# Patient Record
Sex: FEMALE | Race: WHITE | NOT HISPANIC OR LATINO | Employment: OTHER | URBAN - METROPOLITAN AREA
[De-identification: names, ages, dates, MRNs, and addresses within clinical notes are randomized per-mention and may not be internally consistent; named-entity substitution may affect disease eponyms.]

---

## 2017-05-12 DIAGNOSIS — Z01.812 PRE-PROCEDURAL LABORATORY EXAMINATION: ICD-10-CM

## 2017-05-12 DIAGNOSIS — Z01.810 PRE-OPERATIVE CARDIOVASCULAR EXAMINATION: ICD-10-CM

## 2017-05-12 LAB
ERYTHROCYTE [DISTWIDTH] IN BLOOD BY AUTOMATED COUNT: 42.8 FL (ref 35.9–50)
HCT VFR BLD AUTO: 41.1 % (ref 37–47)
HGB BLD-MCNC: 13.4 G/DL (ref 12–16)
MCH RBC QN AUTO: 29.3 PG (ref 27–33)
MCHC RBC AUTO-ENTMCNC: 32.6 G/DL (ref 33.6–35)
MCV RBC AUTO: 89.7 FL (ref 81.4–97.8)
PLATELET # BLD AUTO: 306 K/UL (ref 164–446)
PMV BLD AUTO: 9.6 FL (ref 9–12.9)
RBC # BLD AUTO: 4.58 M/UL (ref 4.2–5.4)
WBC # BLD AUTO: 7.2 K/UL (ref 4.8–10.8)

## 2017-05-12 PROCEDURE — 93010 ELECTROCARDIOGRAM REPORT: CPT | Performed by: INTERNAL MEDICINE

## 2017-05-12 PROCEDURE — 36415 COLL VENOUS BLD VENIPUNCTURE: CPT

## 2017-05-12 PROCEDURE — 85027 COMPLETE CBC AUTOMATED: CPT

## 2017-05-12 PROCEDURE — 93005 ELECTROCARDIOGRAM TRACING: CPT

## 2017-05-12 RX ORDER — LEVOTHYROXINE SODIUM 88 UG/1
88 TABLET ORAL
COMMUNITY
End: 2022-03-01 | Stop reason: SDUPTHER

## 2017-05-12 RX ORDER — M-VIT,TX,IRON,MINS/CALC/FOLIC 27MG-0.4MG
1 TABLET ORAL DAILY
Status: ON HOLD | COMMUNITY
End: 2021-08-08

## 2017-05-13 LAB — EKG IMPRESSION: NORMAL

## 2017-06-02 ENCOUNTER — HOSPITAL ENCOUNTER (OUTPATIENT)
Facility: MEDICAL CENTER | Age: 67
End: 2017-06-02
Attending: OTOLARYNGOLOGY | Admitting: OTOLARYNGOLOGY
Payer: MEDICARE

## 2017-06-02 VITALS
RESPIRATION RATE: 16 BRPM | SYSTOLIC BLOOD PRESSURE: 192 MMHG | HEART RATE: 67 BPM | WEIGHT: 160.94 LBS | BODY MASS INDEX: 28.52 KG/M2 | TEMPERATURE: 98.1 F | HEIGHT: 63 IN | DIASTOLIC BLOOD PRESSURE: 80 MMHG | OXYGEN SATURATION: 97 %

## 2017-06-02 PROBLEM — J34.2 DEVIATED NASAL SEPTUM: Status: ACTIVE | Noted: 2017-06-02

## 2017-06-02 PROCEDURE — 700102 HCHG RX REV CODE 250 W/ 637 OVERRIDE(OP)

## 2017-06-02 PROCEDURE — 160035 HCHG PACU - 1ST 60 MINS PHASE I: Performed by: OTOLARYNGOLOGY

## 2017-06-02 PROCEDURE — 160009 HCHG ANES TIME/MIN: Performed by: OTOLARYNGOLOGY

## 2017-06-02 PROCEDURE — 160036 HCHG PACU - EA ADDL 30 MINS PHASE I: Performed by: OTOLARYNGOLOGY

## 2017-06-02 PROCEDURE — 160041 HCHG SURGERY MINUTES - EA ADDL 1 MIN LEVEL 4: Performed by: OTOLARYNGOLOGY

## 2017-06-02 PROCEDURE — 502626 HCHG SURGIFLO HEMOSTATIC MATRIX 6ML: Performed by: OTOLARYNGOLOGY

## 2017-06-02 PROCEDURE — 700111 HCHG RX REV CODE 636 W/ 250 OVERRIDE (IP)

## 2017-06-02 PROCEDURE — 700101 HCHG RX REV CODE 250

## 2017-06-02 PROCEDURE — 501838 HCHG SUTURE GENERAL: Performed by: OTOLARYNGOLOGY

## 2017-06-02 PROCEDURE — 502573 HCHG PACK, ENT: Performed by: OTOLARYNGOLOGY

## 2017-06-02 PROCEDURE — 500331 HCHG COTTONOID, SURG PATTIE: Performed by: OTOLARYNGOLOGY

## 2017-06-02 PROCEDURE — 160002 HCHG RECOVERY MINUTES (STAT): Performed by: OTOLARYNGOLOGY

## 2017-06-02 PROCEDURE — 500125 HCHG BOVIE, HANDLE: Performed by: OTOLARYNGOLOGY

## 2017-06-02 PROCEDURE — 501404 HCHG SPLINT, NASAL DOYLE AIRWAY: Performed by: OTOLARYNGOLOGY

## 2017-06-02 PROCEDURE — 160048 HCHG OR STATISTICAL LEVEL 1-5: Performed by: OTOLARYNGOLOGY

## 2017-06-02 PROCEDURE — 502240 HCHG MISC OR SUPPLY RC 0272: Performed by: OTOLARYNGOLOGY

## 2017-06-02 PROCEDURE — A9270 NON-COVERED ITEM OR SERVICE: HCPCS

## 2017-06-02 PROCEDURE — 160029 HCHG SURGERY MINUTES - 1ST 30 MINS LEVEL 4: Performed by: OTOLARYNGOLOGY

## 2017-06-02 RX ORDER — OXYMETAZOLINE HYDROCHLORIDE 0.05 G/100ML
SPRAY NASAL
Status: DISCONTINUED | OUTPATIENT
Start: 2017-06-02 | End: 2017-06-02 | Stop reason: HOSPADM

## 2017-06-02 RX ORDER — SODIUM CHLORIDE, SODIUM LACTATE, POTASSIUM CHLORIDE, CALCIUM CHLORIDE 600; 310; 30; 20 MG/100ML; MG/100ML; MG/100ML; MG/100ML
INJECTION, SOLUTION INTRAVENOUS CONTINUOUS
Status: DISCONTINUED | OUTPATIENT
Start: 2017-06-02 | End: 2017-06-02 | Stop reason: HOSPADM

## 2017-06-02 RX ORDER — CEPHALEXIN 500 MG/1
500 CAPSULE ORAL 2 TIMES DAILY
Qty: 20 CAP | Refills: 0 | Status: ON HOLD | OUTPATIENT
Start: 2017-06-02 | End: 2021-08-08

## 2017-06-02 RX ORDER — OXYCODONE HCL 5 MG/5 ML
SOLUTION, ORAL ORAL
Status: COMPLETED
Start: 2017-06-02 | End: 2017-06-02

## 2017-06-02 RX ORDER — HYDROCODONE BITARTRATE AND ACETAMINOPHEN 5; 325 MG/1; MG/1
1-2 TABLET ORAL EVERY 4 HOURS PRN
Qty: 50 TAB | Refills: 0 | Status: ON HOLD | OUTPATIENT
Start: 2017-06-02 | End: 2021-08-08

## 2017-06-02 RX ORDER — LIDOCAINE HYDROCHLORIDE AND EPINEPHRINE 10; 10 MG/ML; UG/ML
INJECTION, SOLUTION INFILTRATION; PERINEURAL
Status: DISCONTINUED | OUTPATIENT
Start: 2017-06-02 | End: 2017-06-02 | Stop reason: HOSPADM

## 2017-06-02 RX ORDER — HYDRALAZINE HYDROCHLORIDE 20 MG/ML
INJECTION INTRAMUSCULAR; INTRAVENOUS
Status: COMPLETED
Start: 2017-06-02 | End: 2017-06-02

## 2017-06-02 RX ORDER — OXYMETAZOLINE HYDROCHLORIDE 0.05 G/100ML
SPRAY NASAL
Status: COMPLETED
Start: 2017-06-02 | End: 2017-06-02

## 2017-06-02 RX ORDER — PRAVASTATIN SODIUM 40 MG
40 TABLET ORAL NIGHTLY
COMMUNITY
End: 2022-06-22 | Stop reason: SDUPTHER

## 2017-06-02 RX ADMIN — SODIUM CHLORIDE, SODIUM LACTATE, POTASSIUM CHLORIDE, CALCIUM CHLORIDE 1000 ML: 600; 310; 30; 20 INJECTION, SOLUTION INTRAVENOUS at 11:30

## 2017-06-02 RX ADMIN — HYDRALAZINE HYDROCHLORIDE 10 MG: 20 INJECTION INTRAMUSCULAR; INTRAVENOUS at 12:48

## 2017-06-02 RX ADMIN — OXYCODONE HYDROCHLORIDE 5 MG: 5 SOLUTION ORAL at 13:15

## 2017-06-02 RX ADMIN — OXYMETAZOLINE HYDROCHLORIDE 2 SPRAY: 5 SPRAY NASAL at 11:30

## 2017-06-02 ASSESSMENT — PAIN SCALES - GENERAL
PAINLEVEL_OUTOF10: 0
PAINLEVEL_OUTOF10: 4
PAINLEVEL_OUTOF10: 0
PAINLEVEL_OUTOF10: 2
PAINLEVEL_OUTOF10: 4
PAINLEVEL_OUTOF10: 0
PAINLEVEL_OUTOF10: 2
PAINLEVEL_OUTOF10: 2
PAINLEVEL_OUTOF10: 5

## 2017-06-02 NOTE — DISCHARGE INSTRUCTIONS
ACTIVITY: Rest and take it easy for the first 24 hours.  A responsible adult is recommended to remain with you during that time.  It is normal to feel sleepy.  We encourage you to not do anything that requires balance, judgment or coordination.    MILD FLU-LIKE SYMPTOMS ARE NORMAL. YOU MAY EXPERIENCE GENERALIZED MUSCLE ACHES, THROAT IRRITATION, HEADACHE AND/OR SOME NAUSEA.    FOR 24 HOURS DO NOT:  Drive, operate machinery or run household appliances.  Drink beer or alcoholic beverages.   Make important decisions or sign legal documents.    SPECIAL INSTRUCTIONS: *see nasal surgery instruction sheet**    DIET: To avoid nausea, slowly advance diet as tolerated, avoiding spicy or greasy foods for the first day.  Add more substantial food to your diet according to your physician's instructions.  Babies can be fed formula or breast milk as soon as they are hungry.  INCREASE FLUIDS AND FIBER TO AVOID CONSTIPATION.    SURGICAL DRESSING/BATHING: *change drip pad as needed, do NOT blow nose**    FOLLOW-UP APPOINTMENT:  A follow-up appointment should be arranged with your doctor; call to schedule.    You should CALL YOUR PHYSICIAN if you develop:  Fever greater than 101 degrees F.  Pain not relieved by medication, or persistent nausea or vomiting.  Excessive bleeding (blood soaking through dressing) or unexpected drainage from the wound.  Extreme redness or swelling around the incision site, drainage of pus or foul smelling drainage.  Inability to urinate or empty your bladder within 8 hours.  Problems with breathing or chest pain.    You should call 911 if you develop problems with breathing or chest pain.  If you are unable to contact your doctor or surgical center, you should go to the nearest emergency room or urgent care center.  Physician's telephone #: *[phone]**    If any questions arise, call your doctor.  If your doctor is not available, please feel free to call the Surgical Center at [phone].  The Center is  open Monday through Friday from 7AM to 7PM.  You can also call the HEALTH HOTLINE open 24 hours/day, 7 days/week and speak to a nurse at [phone], or toll free at [phone].    A registered nurse may call you a few days after your surgery to see how you are doing after your procedure.    MEDICATIONS: Resume taking daily medication.  Take prescribed pain medication with food.  If no medication is prescribed, you may take non-aspirin pain medication if needed.  PAIN MEDICATION CAN BE VERY CONSTIPATING.  Take a stool softener or laxative such as senokot, pericolace, or milk of magnesia if needed.    Prescription given for *keflex, and norco**.  Last pain medication given at **____________*.    If your physician has prescribed pain medication that includes Acetaminophen (Tylenol), do not take additional Acetaminophen (Tylenol) while taking the prescribed medication.    Depression / Suicide Risk    As you are discharged from this [facility] facility, it is important to learn how to keep safe from harming yourself.    Recognize the warning signs:  · Abrupt changes in personality, positive or negative- including increase in energy   · Giving away possessions  · Change in eating patterns- significant weight changes-  positive or negative  · Change in sleeping patterns- unable to sleep or sleeping all the time   · Unwillingness or inability to communicate  · Depression  · Unusual sadness, discouragement and loneliness  · Talk of wanting to die  · Neglect of personal appearance   · Rebelliousness- reckless behavior  · Withdrawal from people/activities they love  · Confusion- inability to concentrate     If you or a loved one observes any of these behaviors or has concerns about self-harm, here's what you can do:  · Talk about it- your feelings and reasons for harming yourself  · Remove any means that you might use to hurt yourself (examples: pills, rope, extension cords, firearm)  · Get professional help from  the community (Mental Health, Substance Abuse, psychological counseling)  · Do not be alone:Call your Safe Contact- someone whom you trust who will be there for you.  · Call your local CRISIS HOTLINE 092-3376 or 172-449-8685  · Call your local Children's Mobile Crisis Response Team [place] (888) 912-6364 or www.myBestHelper  · Call the toll free National Suicide Prevention Hotlines   · National Suicide Prevention Lifeline 204-849-FHFT (3492)  · National Hope Line Network 800-SUICIDE (434-1847)

## 2017-06-02 NOTE — PROGRESS NOTES
1245-received pt from OR with report from Dr Anderson.  Pt hypertensive, medicated with hydralazine per order. Drip pad to nose, dry. HOB elevated. Pt denies pain.  1315-pt medicated with oxycodone PO for pain 5/10.  BP improved  1330-sister to bedside.    1345-pt rates pain 4/10, tolerable.  Handoff report to Judit LOCK

## 2017-06-02 NOTE — IP AVS SNAPSHOT
" Home Care Instructions                                                                                                                Name:Yuki Caraballo  Medical Record Number:[id number]  CSN: [id number]    YOB: 1950   Age: 67 y.o.  Sex: female  HT:1.6 m (5' 3\") WT: 73 kg (160 lb 15 oz)          Admit Date: 6/2/2017     Discharge Date:   Today's Date: 6/2/2017  Attending Doctor:  Rodrick Coronado M.D.                  Allergies:  Tequin                Discharge Instructions         ACTIVITY: Rest and take it easy for the first 24 hours.  A responsible adult is recommended to remain with you during that time.  It is normal to feel sleepy.  We encourage you to not do anything that requires balance, judgment or coordination.    MILD FLU-LIKE SYMPTOMS ARE NORMAL. YOU MAY EXPERIENCE GENERALIZED MUSCLE ACHES, THROAT IRRITATION, HEADACHE AND/OR SOME NAUSEA.    FOR 24 HOURS DO NOT:  Drive, operate machinery or run household appliances.  Drink beer or alcoholic beverages.   Make important decisions or sign legal documents.    SPECIAL INSTRUCTIONS: *see nasal surgery instruction sheet**    DIET: To avoid nausea, slowly advance diet as tolerated, avoiding spicy or greasy foods for the first day.  Add more substantial food to your diet according to your physician's instructions.  Babies can be fed formula or breast milk as soon as they are hungry.  INCREASE FLUIDS AND FIBER TO AVOID CONSTIPATION.    SURGICAL DRESSING/BATHING: *change drip pad as needed, do NOT blow nose**    FOLLOW-UP APPOINTMENT:  A follow-up appointment should be arranged with your doctor; call to schedule.    You should CALL YOUR PHYSICIAN if you develop:  Fever greater than 101 degrees F.  Pain not relieved by medication, or persistent nausea or vomiting.  Excessive bleeding (blood soaking through dressing) or unexpected drainage from the wound.  Extreme redness or swelling around the incision site, drainage of pus or foul smelling " drainage.  Inability to urinate or empty your bladder within 8 hours.  Problems with breathing or chest pain.    You should call 911 if you develop problems with breathing or chest pain.  If you are unable to contact your doctor or surgical center, you should go to the nearest emergency room or urgent care center.  Physician's telephone #: *[phone]**    If any questions arise, call your doctor.  If your doctor is not available, please feel free to call the Surgical Center at [phone].  The Center is open Monday through Friday from 7AM to 7PM.  You can also call the [employer] HOTLINE open 24 hours/day, 7 days/week and speak to a nurse at [phone], or toll free at [phone].    A registered nurse may call you a few days after your surgery to see how you are doing after your procedure.    MEDICATIONS: Resume taking daily medication.  Take prescribed pain medication with food.  If no medication is prescribed, you may take non-aspirin pain medication if needed.  PAIN MEDICATION CAN BE VERY CONSTIPATING.  Take a stool softener or laxative such as senokot, pericolace, or milk of magnesia if needed.    Prescription given for *keflex, and norco**.  Last pain medication given at **____________*.    If your physician has prescribed pain medication that includes Acetaminophen (Tylenol), do not take additional Acetaminophen (Tylenol) while taking the prescribed medication.    Depression / Suicide Risk    As you are discharged from this [facility] facility, it is important to learn how to keep safe from harming yourself.    Recognize the warning signs:  · Abrupt changes in personality, positive or negative- including increase in energy   · Giving away possessions  · Change in eating patterns- significant weight changes-  positive or negative  · Change in sleeping patterns- unable to sleep or sleeping all the time   · Unwillingness or inability to communicate  · Depression  · Unusual sadness, discouragement and  loneliness  · Talk of wanting to die  · Neglect of personal appearance   · Rebelliousness- reckless behavior  · Withdrawal from people/activities they love  · Confusion- inability to concentrate     If you or a loved one observes any of these behaviors or has concerns about self-harm, here's what you can do:  · Talk about it- your feelings and reasons for harming yourself  · Remove any means that you might use to hurt yourself (examples: pills, rope, extension cords, firearm)  · Get professional help from the community (Mental Health, Substance Abuse, psychological counseling)  · Do not be alone:Call your Safe Contact- someone whom you trust who will be there for you.  · Call your local CRISIS HOTLINE 564-3824 or 488-027-7893  · Call your local Children's Mobile Crisis Response Team [place] (646) 822-2213 or www.[employer] Mobile  · Call the toll free National Suicide Prevention Hotlines   · National Suicide Prevention Lifeline 990-228-NQXU (5232)  · National Hope Line Network 800-SUICIDE (208-5549)       Medication List      START taking these medications        Instructions    Morning Afternoon Evening Bedtime    cephALEXin 500 MG Caps   Commonly known as:  KEFLEX        Take 1 Cap by mouth 2 times a day.   Dose:  500 mg                        hydrocodone-acetaminophen 5-325 MG Tabs per tablet   Commonly known as:  NORCO        Take 1-2 Tabs by mouth every four hours as needed.   Dose:  1-2 Tab                          ASK your doctor about these medications        Instructions    Morning Afternoon Evening Bedtime    levothyroxine 88 MCG Tabs   Commonly known as:  SYNTHROID        Take 88 mcg by mouth Every morning on an empty stomach.   Dose:  88 mcg                        pravastatin 10 MG Tabs   Commonly known as:  PRAVACHOL        Take 10 mg by mouth every evening.   Dose:  10 mg                        PROBIOTIC PO        Take  by mouth 1 time daily as needed.                        therapeutic  multivitamin-minerals Tabs        Take 1 Tab by mouth every day.   Dose:  1 Tab                             Where to Get Your Medications      You can get these medications from any pharmacy     Bring a paper prescription for each of these medications    - cephALEXin 500 MG Caps  - hydrocodone-acetaminophen 5-325 MG Tabs per tablet            Medication Information     Above and/or attached are the medications your physician expects you to take upon discharge. Review all of your home medications and newly ordered medications with your doctor and/or pharmacist. Follow medication instructions as directed by your doctor and/or pharmacist. Please keep your medication list with you and share with your physician. Update the information when medications are discontinued, doses are changed, or new medications (including over-the-counter products) are added; and carry medication information at all times in the event of emergency situations.        Resources     Quit Smoking / Tobacco Use:    I understand the use of any tobacco products increases my chance of suffering from future heart disease or stroke and could cause other illnesses which may shorten my life. Quitting the use of tobacco products is the single most important thing I can do to improve my health. For further information on smoking / tobacco cessation call a Toll Free Quit Line at 1-370.469.8293 (*[employer]) or 1-275.745.6075 (American Lung Association) or you can access the web based program at www.lungusa.org.    [place] Tobacco Users Help Line:  (569) 160-8558       Toll Free: 1-637.381.1415  Quit Tobacco Program [facility] (646)850-6521    Crisis Hotline:    [place] Crisis Hotline:  0-945-XNGGNIM or 1-477.109.1665    [place] Crisis Hotline:    1-810.252.2047 or 993-403-6155    Discharge Survey:   Thank you for choosing [facility]. We hope we did everything we could to make your hospital stay a pleasant one. You may  be receiving a survey and we would appreciate your time and participation in answering the questions. Your input is very valuable to us in our efforts to improve our service to our patients and their families.            Signatures     My signature on this form indicates that:    1. I acknowledge receipt and understanding of these Home Care Instruction.  2. My questions regarding this information have been answered to my satisfaction.  3. I have formulated a plan with my discharge nurse to obtain my prescribed medications for home.    __________________________________      __________________________________                   Patient Signature                                 Guardian/Responsible Adult Signature      __________________________________                 __________       ________                       Nurse Signature                                               Date                 Time

## 2017-06-02 NOTE — IP AVS SNAPSHOT
6/2/2017    Yuki Caraballo  [address] [place] [address]    Dear Yuki:    [facility] wants to ensure your discharge home is safe and you or your loved ones have had all of your questions answered regarding your care after you leave the hospital.    Below is a list of resources and contact information should you have any questions regarding your hospital stay, follow-up instructions, or active medical symptoms.    Questions or Concerns Regarding… Contact   Medical Questions Related to Your Discharge  (7 days a week, 8am-5pm) Contact a Nurse Care Coordinator   [phone]   Medical Questions Not Related to Your Discharge  (24 hours a day / 7 days a week)  Contact the Nurse Health Line   [phone]    Medications or Discharge Instructions Refer to your discharge packet   or contact your [facility] Primary Care Provider   [phone]   Follow-up Appointment(s) Schedule your appointment via [employer]   or contact Scheduling [phone]   Billing Review your statement via [employer]  or contact Billing [phone]   Medical Records Review your records via [employer]   or contact Medical Records [phone]     You may receive a telephone call within two days of discharge. This call is to make certain you understand your discharge instructions and have the opportunity to have any questions answered. You can also easily access your medical information, test results and upcoming appointments via the [employer] free online health management tool. You can learn more and sign up at [employer]ee. For assistance setting up your [employer] account, please call [phone].    Once again, we want to ensure your discharge home is safe and that you have a clear understanding of any next steps in your care. If you have any questions or concerns, please do not hesitate to contact us, we are here for you. Thank you for choosing [facility] for your healthcare needs.    Sincerely,    Your [facility] Healthcare Team

## 2017-06-02 NOTE — OR NURSING
1345 Received report from ASHVIN Gavin. VSS< in no signs of distress. Sister at BS.    1415 Discharge instructions given to pt and family, both verbalize understanding.   Pt meets discharge criteria. Able to dress and steady on feet. New drip pad applied to nose, small amount of bloody drainage to note. Two sprays of afrin applied to each nostril.    1425 Pt discharged, taken to car in  by volunteer.  All questions/concerns addressed.

## 2017-06-02 NOTE — OR SURGEON
Immediate Post-Operative Note      PreOp Diagnosis: septal deviation, bilateral inferior turbinate hypertrophy    PostOp Diagnosis: same    Procedure(s):  SEPTOPLASTY - Wound Class: Clean Contaminated  TURBINATE REDUCTION - RESECTION W/SUBMUCOSAL APPROACH - Wound Class: Clean Contaminated    Surgeon(s):  Rodrick Coronado M.D.    Anesthesiologist/Type of Anesthesia:  Anesthesiologist: Broderick Anderson M.D./General    Surgical Staff:  Circulator: Dotty Harper R.N.  Relief Circulator: Chelita Gonzalez R.N.  Scrub Person: Deepti Riggs    Specimen: none    Estimated Blood Loss: 30 ml    Findings: septal dev    Complications: none        6/2/2017 12:45 PM Rodrick Coronado

## 2017-06-05 NOTE — OP REPORT
DATE OF SERVICE:  06/02/2017    DATE OF OPERATION:  06/02/2017    PREOPERATIVE DIAGNOSIS:  Nasal septal deviation with bilateral inferior   turbinate hypertrophy.    POSTOPERATIVE DIAGNOSES:  Nasal septal deviation with bilateral inferior   turbinate hypertrophy.    PROCEDURE PERFORMED:  Nasal septoplasty with bilateral submucous resection of   the inferior turbinates.    SURGEON:  Rodrick Coronado MD    ANESTHESIA:  General endotracheal.    INDICATIONS:  This is a very nice patient with recurrent epistaxis and nasal   obstruction secondary to septal deviation.  Correction is indicated.    DESCRIPTION OF PROCEDURE:  The patient was placed on the operating table in   supine position.  After adequate general endotracheal anesthesia was   administered, the right and left septal mucosa and right and left inferior   turbinates were anesthetized with 1% lidocaine with 1:100,000 epinephrine   using approximately 10 mL.  Afrin-soaked pledgets were placed in the nose   bilaterally and the face was draped in a clean fashion.  A hemitransfixion   incision was performed on the left with #15 blade and a submucoperichondrial   and mucoperiosteal flap was raised with a caudal elevator.  Anterior to the   septal deflection, the septal cartilage was incised and an opposite-sided   submucoperichondrial and mucoperiosteal flap was raised with a caudal   elevator.  The deviated portion of the nasal septal cartilage and bone were   removed with a combination of a Magdi forceps and a V-chisel.  Once the   septum was reduced to the midline, the hemitransfixion incision was closed   with a 4-0 Vicryl in an interrupted fashion.  The right and left inferior   turbinates were then trimmed in the submucous fashion using straight and   curved turbinate scissors under direct visualization with a 4 mm, 0-degree   Storz endoscope.  Hemostasis was acquired by using suction electrocautery.    Surgiflo was layered along the inferior turbinates  and bacitracin   ointment-soaked Martinez splints were placed in the right and left nares and   secured anteriorly to the columella with a 3-0 nylon suture.  The patient was   extubated in the operating room, brought to the recovery room in satisfactory   condition and there were no intraoperative complications.       ____________________________________     MD KIMBERLEE WEEMS / COLLETTE    DD:  06/05/2017 08:00:04  DT:  06/05/2017 09:52:03    D#:  [id number]  Job#:  [id number]

## 2021-01-15 DIAGNOSIS — Z23 NEED FOR VACCINATION: ICD-10-CM

## 2021-08-08 ENCOUNTER — APPOINTMENT (OUTPATIENT)
Dept: RADIOLOGY | Facility: MEDICAL CENTER | Age: 71
End: 2021-08-08
Attending: STUDENT IN AN ORGANIZED HEALTH CARE EDUCATION/TRAINING PROGRAM
Payer: MEDICARE

## 2021-08-08 ENCOUNTER — APPOINTMENT (OUTPATIENT)
Dept: RADIOLOGY | Facility: MEDICAL CENTER | Age: 71
End: 2021-08-08
Attending: EMERGENCY MEDICINE
Payer: MEDICARE

## 2021-08-08 ENCOUNTER — HOSPITAL ENCOUNTER (OUTPATIENT)
Facility: MEDICAL CENTER | Age: 71
End: 2021-08-09
Attending: EMERGENCY MEDICINE | Admitting: STUDENT IN AN ORGANIZED HEALTH CARE EDUCATION/TRAINING PROGRAM
Payer: MEDICARE

## 2021-08-08 DIAGNOSIS — S82.842A: ICD-10-CM

## 2021-08-08 DIAGNOSIS — S82.892P CLOSED FRACTURE OF LEFT ANKLE WITH MALUNION: ICD-10-CM

## 2021-08-08 DIAGNOSIS — I95.9 HYPOTENSION, UNSPECIFIED HYPOTENSION TYPE: ICD-10-CM

## 2021-08-08 DIAGNOSIS — R55 SYNCOPE, UNSPECIFIED SYNCOPE TYPE: ICD-10-CM

## 2021-08-08 PROBLEM — E03.9 ACQUIRED HYPOTHYROIDISM: Status: ACTIVE | Noted: 2021-08-08

## 2021-08-08 PROBLEM — R73.9 HYPERGLYCEMIA: Status: ACTIVE | Noted: 2021-08-08

## 2021-08-08 PROBLEM — S82.899A CLOSED FRACTURE OF ANKLE: Status: ACTIVE | Noted: 2021-08-08

## 2021-08-08 PROBLEM — E87.1 HYPONATREMIA: Status: ACTIVE | Noted: 2021-08-08

## 2021-08-08 PROBLEM — I10 ESSENTIAL HYPERTENSION: Status: ACTIVE | Noted: 2021-08-08

## 2021-08-08 PROBLEM — E78.2 MIXED HYPERLIPIDEMIA: Status: ACTIVE | Noted: 2021-08-08

## 2021-08-08 LAB
ALBUMIN SERPL BCP-MCNC: 3.8 G/DL (ref 3.2–4.9)
ALBUMIN/GLOB SERPL: 1.2 G/DL
ALP SERPL-CCNC: 60 U/L (ref 30–99)
ALT SERPL-CCNC: 20 U/L (ref 2–50)
ANION GAP SERPL CALC-SCNC: 13 MMOL/L (ref 7–16)
AST SERPL-CCNC: 20 U/L (ref 12–45)
BASOPHILS # BLD AUTO: 0.3 % (ref 0–1.8)
BASOPHILS # BLD: 0.03 K/UL (ref 0–0.12)
BILIRUB SERPL-MCNC: 0.4 MG/DL (ref 0.1–1.5)
BUN SERPL-MCNC: 18 MG/DL (ref 8–22)
CALCIUM SERPL-MCNC: 9.3 MG/DL (ref 8.5–10.5)
CHLORIDE SERPL-SCNC: 99 MMOL/L (ref 96–112)
CO2 SERPL-SCNC: 22 MMOL/L (ref 20–33)
CREAT SERPL-MCNC: 1.19 MG/DL (ref 0.5–1.4)
EKG IMPRESSION: NORMAL
EOSINOPHIL # BLD AUTO: 0.07 K/UL (ref 0–0.51)
EOSINOPHIL NFR BLD: 0.8 % (ref 0–6.9)
ERYTHROCYTE [DISTWIDTH] IN BLOOD BY AUTOMATED COUNT: 43.8 FL (ref 35.9–50)
EST. AVERAGE GLUCOSE BLD GHB EST-MCNC: 111 MG/DL
GLOBULIN SER CALC-MCNC: 3.1 G/DL (ref 1.9–3.5)
GLUCOSE SERPL-MCNC: 158 MG/DL (ref 65–99)
HBA1C MFR BLD: 5.5 % (ref 4–5.6)
HCT VFR BLD AUTO: 37.4 % (ref 37–47)
HGB BLD-MCNC: 12.8 G/DL (ref 12–16)
IMM GRANULOCYTES # BLD AUTO: 0.03 K/UL (ref 0–0.11)
IMM GRANULOCYTES NFR BLD AUTO: 0.3 % (ref 0–0.9)
LACTATE BLD-SCNC: 1.6 MMOL/L (ref 0.5–2)
LYMPHOCYTES # BLD AUTO: 3.57 K/UL (ref 1–4.8)
LYMPHOCYTES NFR BLD: 39.5 % (ref 22–41)
MCH RBC QN AUTO: 30.8 PG (ref 27–33)
MCHC RBC AUTO-ENTMCNC: 34.2 G/DL (ref 33.6–35)
MCV RBC AUTO: 90.1 FL (ref 81.4–97.8)
MONOCYTES # BLD AUTO: 0.52 K/UL (ref 0–0.85)
MONOCYTES NFR BLD AUTO: 5.8 % (ref 0–13.4)
NEUTROPHILS # BLD AUTO: 4.81 K/UL (ref 2–7.15)
NEUTROPHILS NFR BLD: 53.3 % (ref 44–72)
NRBC # BLD AUTO: 0 K/UL
NRBC BLD-RTO: 0 /100 WBC
PLATELET # BLD AUTO: 279 K/UL (ref 164–446)
PMV BLD AUTO: 10.1 FL (ref 9–12.9)
POTASSIUM SERPL-SCNC: 3.6 MMOL/L (ref 3.6–5.5)
PROT SERPL-MCNC: 6.9 G/DL (ref 6–8.2)
RBC # BLD AUTO: 4.15 M/UL (ref 4.2–5.4)
SODIUM SERPL-SCNC: 134 MMOL/L (ref 135–145)
TROPONIN T SERPL-MCNC: 9 NG/L (ref 6–19)
TSH SERPL DL<=0.005 MIU/L-ACNC: 1.36 UIU/ML (ref 0.38–5.33)
WBC # BLD AUTO: 9 K/UL (ref 4.8–10.8)

## 2021-08-08 PROCEDURE — 700111 HCHG RX REV CODE 636 W/ 250 OVERRIDE (IP): Performed by: EMERGENCY MEDICINE

## 2021-08-08 PROCEDURE — 73600 X-RAY EXAM OF ANKLE: CPT | Mod: LT

## 2021-08-08 PROCEDURE — 93005 ELECTROCARDIOGRAM TRACING: CPT

## 2021-08-08 PROCEDURE — 99220 PR INITIAL OBSERVATION CARE,LEVL III: CPT | Performed by: STUDENT IN AN ORGANIZED HEALTH CARE EDUCATION/TRAINING PROGRAM

## 2021-08-08 PROCEDURE — 83605 ASSAY OF LACTIC ACID: CPT

## 2021-08-08 PROCEDURE — 85025 COMPLETE CBC W/AUTO DIFF WBC: CPT

## 2021-08-08 PROCEDURE — 84443 ASSAY THYROID STIM HORMONE: CPT

## 2021-08-08 PROCEDURE — 93005 ELECTROCARDIOGRAM TRACING: CPT | Performed by: EMERGENCY MEDICINE

## 2021-08-08 PROCEDURE — 96375 TX/PRO/DX INJ NEW DRUG ADDON: CPT | Mod: XU

## 2021-08-08 PROCEDURE — 99285 EMERGENCY DEPT VISIT HI MDM: CPT

## 2021-08-08 PROCEDURE — 84484 ASSAY OF TROPONIN QUANT: CPT

## 2021-08-08 PROCEDURE — 302875 HCHG BANDAGE ACE 4 OR 6""

## 2021-08-08 PROCEDURE — 302874 HCHG BANDAGE ACE 2 OR 3""

## 2021-08-08 PROCEDURE — G0378 HOSPITAL OBSERVATION PER HR: HCPCS

## 2021-08-08 PROCEDURE — 29505 APPLICATION LONG LEG SPLINT: CPT

## 2021-08-08 PROCEDURE — 80053 COMPREHEN METABOLIC PANEL: CPT

## 2021-08-08 PROCEDURE — 94799 UNLISTED PULMONARY SVC/PX: CPT

## 2021-08-08 PROCEDURE — 27840 TREAT ANKLE DISLOCATION: CPT

## 2021-08-08 PROCEDURE — 71045 X-RAY EXAM CHEST 1 VIEW: CPT

## 2021-08-08 PROCEDURE — 96376 TX/PRO/DX INJ SAME DRUG ADON: CPT | Mod: XU

## 2021-08-08 PROCEDURE — 83036 HEMOGLOBIN GLYCOSYLATED A1C: CPT

## 2021-08-08 PROCEDURE — 96374 THER/PROPH/DIAG INJ IV PUSH: CPT | Mod: XU

## 2021-08-08 PROCEDURE — 700105 HCHG RX REV CODE 258: Performed by: EMERGENCY MEDICINE

## 2021-08-08 RX ORDER — PROPOFOL 10 MG/ML
100 INJECTION, EMULSION INTRAVENOUS ONCE
Status: COMPLETED | OUTPATIENT
Start: 2021-08-08 | End: 2021-08-08

## 2021-08-08 RX ORDER — ENALAPRILAT 1.25 MG/ML
1.25 INJECTION INTRAVENOUS EVERY 6 HOURS PRN
Status: DISCONTINUED | OUTPATIENT
Start: 2021-08-08 | End: 2021-08-09 | Stop reason: HOSPADM

## 2021-08-08 RX ORDER — PRAVASTATIN SODIUM 20 MG
40 TABLET ORAL NIGHTLY
Status: DISCONTINUED | OUTPATIENT
Start: 2021-08-08 | End: 2021-08-08

## 2021-08-08 RX ORDER — POLYETHYLENE GLYCOL 3350 17 G/17G
1 POWDER, FOR SOLUTION ORAL
Status: DISCONTINUED | OUTPATIENT
Start: 2021-08-08 | End: 2021-08-09 | Stop reason: HOSPADM

## 2021-08-08 RX ORDER — PROCHLORPERAZINE EDISYLATE 5 MG/ML
5-10 INJECTION INTRAMUSCULAR; INTRAVENOUS EVERY 6 HOURS PRN
Status: DISCONTINUED | OUTPATIENT
Start: 2021-08-08 | End: 2021-08-09 | Stop reason: HOSPADM

## 2021-08-08 RX ORDER — ONDANSETRON 2 MG/ML
4 INJECTION INTRAMUSCULAR; INTRAVENOUS ONCE
Status: COMPLETED | OUTPATIENT
Start: 2021-08-08 | End: 2021-08-08

## 2021-08-08 RX ORDER — ONDANSETRON 4 MG/1
4 TABLET, ORALLY DISINTEGRATING ORAL EVERY 4 HOURS PRN
Status: DISCONTINUED | OUTPATIENT
Start: 2021-08-08 | End: 2021-08-08

## 2021-08-08 RX ORDER — HYDROCODONE BITARTRATE AND ACETAMINOPHEN 5; 325 MG/1; MG/1
1-2 TABLET ORAL EVERY 4 HOURS PRN
Status: DISCONTINUED | OUTPATIENT
Start: 2021-08-08 | End: 2021-08-09

## 2021-08-08 RX ORDER — PRAVASTATIN SODIUM 10 MG
10 TABLET ORAL NIGHTLY
Status: DISCONTINUED | OUTPATIENT
Start: 2021-08-08 | End: 2021-08-08

## 2021-08-08 RX ORDER — IRBESARTAN 150 MG/1
150 TABLET ORAL NIGHTLY
Status: ON HOLD | COMMUNITY
End: 2021-08-09

## 2021-08-08 RX ORDER — AMOXICILLIN 250 MG
2 CAPSULE ORAL 2 TIMES DAILY
Status: DISCONTINUED | OUTPATIENT
Start: 2021-08-08 | End: 2021-08-09 | Stop reason: HOSPADM

## 2021-08-08 RX ORDER — ACETAMINOPHEN 325 MG/1
650 TABLET ORAL EVERY 6 HOURS PRN
Status: DISCONTINUED | OUTPATIENT
Start: 2021-08-08 | End: 2021-08-09 | Stop reason: HOSPADM

## 2021-08-08 RX ORDER — MORPHINE SULFATE 4 MG/ML
4 INJECTION, SOLUTION INTRAMUSCULAR; INTRAVENOUS ONCE
Status: COMPLETED | OUTPATIENT
Start: 2021-08-08 | End: 2021-08-08

## 2021-08-08 RX ORDER — SODIUM CHLORIDE 9 MG/ML
1000 INJECTION, SOLUTION INTRAVENOUS ONCE
Status: COMPLETED | OUTPATIENT
Start: 2021-08-08 | End: 2021-08-08

## 2021-08-08 RX ORDER — PRAVASTATIN SODIUM 20 MG
40 TABLET ORAL NIGHTLY
Status: DISCONTINUED | OUTPATIENT
Start: 2021-08-09 | End: 2021-08-09 | Stop reason: HOSPADM

## 2021-08-08 RX ORDER — IRBESARTAN 150 MG/1
75 TABLET ORAL
Status: DISCONTINUED | OUTPATIENT
Start: 2021-08-09 | End: 2021-08-09 | Stop reason: HOSPADM

## 2021-08-08 RX ORDER — PROPOFOL 10 MG/ML
2 INJECTION, EMULSION INTRAVENOUS ONCE
Status: COMPLETED | OUTPATIENT
Start: 2021-08-08 | End: 2021-08-08

## 2021-08-08 RX ORDER — LABETALOL HYDROCHLORIDE 5 MG/ML
10 INJECTION, SOLUTION INTRAVENOUS EVERY 4 HOURS PRN
Status: DISCONTINUED | OUTPATIENT
Start: 2021-08-08 | End: 2021-08-09 | Stop reason: HOSPADM

## 2021-08-08 RX ORDER — BISACODYL 10 MG
10 SUPPOSITORY, RECTAL RECTAL
Status: DISCONTINUED | OUTPATIENT
Start: 2021-08-08 | End: 2021-08-09 | Stop reason: HOSPADM

## 2021-08-08 RX ORDER — SODIUM CHLORIDE 9 MG/ML
500 INJECTION, SOLUTION INTRAVENOUS
Status: ACTIVE | OUTPATIENT
Start: 2021-08-08 | End: 2021-08-08

## 2021-08-08 RX ORDER — PROPOFOL 10 MG/ML
INJECTION, EMULSION INTRAVENOUS
Status: COMPLETED | OUTPATIENT
Start: 2021-08-08 | End: 2021-08-08

## 2021-08-08 RX ORDER — ONDANSETRON 2 MG/ML
4 INJECTION INTRAMUSCULAR; INTRAVENOUS EVERY 4 HOURS PRN
Status: DISCONTINUED | OUTPATIENT
Start: 2021-08-08 | End: 2021-08-08

## 2021-08-08 RX ORDER — LEVOTHYROXINE SODIUM 88 UG/1
88 TABLET ORAL
Status: DISCONTINUED | OUTPATIENT
Start: 2021-08-09 | End: 2021-08-09 | Stop reason: HOSPADM

## 2021-08-08 RX ADMIN — PROPOFOL 100 MG: 10 INJECTION, EMULSION INTRAVENOUS at 22:40

## 2021-08-08 RX ADMIN — PROPOFOL 80 MG: 10 INJECTION, EMULSION INTRAVENOUS at 21:17

## 2021-08-08 RX ADMIN — PROPOFOL 20 MG: 10 INJECTION, EMULSION INTRAVENOUS at 22:45

## 2021-08-08 RX ADMIN — ONDANSETRON 4 MG: 2 INJECTION INTRAMUSCULAR; INTRAVENOUS at 20:35

## 2021-08-08 RX ADMIN — SODIUM CHLORIDE 1000 ML: 9 INJECTION, SOLUTION INTRAVENOUS at 20:35

## 2021-08-08 RX ADMIN — MORPHINE SULFATE 4 MG: 4 INJECTION INTRAVENOUS at 20:35

## 2021-08-08 RX ADMIN — MORPHINE SULFATE 4 MG: 4 INJECTION INTRAVENOUS at 21:55

## 2021-08-08 ASSESSMENT — LIFESTYLE VARIABLES
HOW MANY TIMES IN THE PAST YEAR HAVE YOU HAD 5 OR MORE DRINKS IN A DAY: 0
TOTAL SCORE: 0
TOTAL SCORE: 0
AVERAGE NUMBER OF DAYS PER WEEK YOU HAVE A DRINK CONTAINING ALCOHOL: 5
DOES PATIENT WANT TO STOP DRINKING: NO
TOTAL SCORE: 0
HAVE YOU EVER FELT YOU SHOULD CUT DOWN ON YOUR DRINKING: NO
HAVE PEOPLE ANNOYED YOU BY CRITICIZING YOUR DRINKING: NO
EVER FELT BAD OR GUILTY ABOUT YOUR DRINKING: NO
EVER HAD A DRINK FIRST THING IN THE MORNING TO STEADY YOUR NERVES TO GET RID OF A HANGOVER: NO
CONSUMPTION TOTAL: NEGATIVE
ALCOHOL_USE: YES
ON A TYPICAL DAY WHEN YOU DRINK ALCOHOL HOW MANY DRINKS DO YOU HAVE: 1

## 2021-08-08 ASSESSMENT — ENCOUNTER SYMPTOMS
WEIGHT LOSS: 0
EYE PAIN: 0
DIARRHEA: 0
PALPITATIONS: 1
WEAKNESS: 0
ORTHOPNEA: 0
DIZZINESS: 0
SORE THROAT: 0
FEVER: 0
SEIZURES: 1
COUGH: 0
WHEEZING: 0
MYALGIAS: 0
ABDOMINAL PAIN: 0
VOMITING: 0
LOSS OF CONSCIOUSNESS: 1
CONSTIPATION: 0
FALLS: 1
BLOOD IN STOOL: 0
CHILLS: 0
BLURRED VISION: 0
NAUSEA: 0
SHORTNESS OF BREATH: 0
HEMOPTYSIS: 0

## 2021-08-08 ASSESSMENT — PATIENT HEALTH QUESTIONNAIRE - PHQ9
SUM OF ALL RESPONSES TO PHQ9 QUESTIONS 1 AND 2: 0
2. FEELING DOWN, DEPRESSED, IRRITABLE, OR HOPELESS: NOT AT ALL
1. LITTLE INTEREST OR PLEASURE IN DOING THINGS: NOT AT ALL

## 2021-08-08 ASSESSMENT — FIBROSIS 4 INDEX
FIB4 SCORE: 1.17
FIB4 SCORE: 1.17

## 2021-08-08 ASSESSMENT — PAIN DESCRIPTION - PAIN TYPE
TYPE: ACUTE PAIN
TYPE: ACUTE PAIN

## 2021-08-09 ENCOUNTER — APPOINTMENT (OUTPATIENT)
Dept: CARDIOLOGY | Facility: MEDICAL CENTER | Age: 71
End: 2021-08-09
Attending: STUDENT IN AN ORGANIZED HEALTH CARE EDUCATION/TRAINING PROGRAM
Payer: MEDICARE

## 2021-08-09 VITALS
BODY MASS INDEX: 27.79 KG/M2 | SYSTOLIC BLOOD PRESSURE: 126 MMHG | HEART RATE: 56 BPM | DIASTOLIC BLOOD PRESSURE: 60 MMHG | HEIGHT: 62 IN | WEIGHT: 151 LBS | OXYGEN SATURATION: 95 % | RESPIRATION RATE: 16 BRPM | TEMPERATURE: 99 F

## 2021-08-09 PROBLEM — R55 SYNCOPE AND COLLAPSE: Status: RESOLVED | Noted: 2021-08-08 | Resolved: 2021-08-09

## 2021-08-09 PROBLEM — R73.9 HYPERGLYCEMIA: Status: RESOLVED | Noted: 2021-08-08 | Resolved: 2021-08-09

## 2021-08-09 LAB
ANION GAP SERPL CALC-SCNC: 7 MMOL/L (ref 7–16)
BASOPHILS # BLD AUTO: 0.1 % (ref 0–1.8)
BASOPHILS # BLD: 0.01 K/UL (ref 0–0.12)
BUN SERPL-MCNC: 18 MG/DL (ref 8–22)
CALCIUM SERPL-MCNC: 9 MG/DL (ref 8.5–10.5)
CHLORIDE SERPL-SCNC: 102 MMOL/L (ref 96–112)
CO2 SERPL-SCNC: 24 MMOL/L (ref 20–33)
CREAT SERPL-MCNC: 1.04 MG/DL (ref 0.5–1.4)
EOSINOPHIL # BLD AUTO: 0.02 K/UL (ref 0–0.51)
EOSINOPHIL NFR BLD: 0.2 % (ref 0–6.9)
ERYTHROCYTE [DISTWIDTH] IN BLOOD BY AUTOMATED COUNT: 45.1 FL (ref 35.9–50)
GLUCOSE SERPL-MCNC: 99 MG/DL (ref 65–99)
HCT VFR BLD AUTO: 34.9 % (ref 37–47)
HGB BLD-MCNC: 12.1 G/DL (ref 12–16)
IMM GRANULOCYTES # BLD AUTO: 0.03 K/UL (ref 0–0.11)
IMM GRANULOCYTES NFR BLD AUTO: 0.4 % (ref 0–0.9)
LV EJECT FRACT  99904: 65
LV EJECT FRACT MOD 2C 99903: 57.68
LV EJECT FRACT MOD 4C 99902: 69.63
LV EJECT FRACT MOD BP 99901: 64.8
LYMPHOCYTES # BLD AUTO: 1.88 K/UL (ref 1–4.8)
LYMPHOCYTES NFR BLD: 23.4 % (ref 22–41)
MAGNESIUM SERPL-MCNC: 1.8 MG/DL (ref 1.5–2.5)
MCH RBC QN AUTO: 31.6 PG (ref 27–33)
MCHC RBC AUTO-ENTMCNC: 34.7 G/DL (ref 33.6–35)
MCV RBC AUTO: 91.1 FL (ref 81.4–97.8)
MONOCYTES # BLD AUTO: 0.51 K/UL (ref 0–0.85)
MONOCYTES NFR BLD AUTO: 6.3 % (ref 0–13.4)
NEUTROPHILS # BLD AUTO: 5.6 K/UL (ref 2–7.15)
NEUTROPHILS NFR BLD: 69.6 % (ref 44–72)
NRBC # BLD AUTO: 0 K/UL
NRBC BLD-RTO: 0 /100 WBC
PLATELET # BLD AUTO: 239 K/UL (ref 164–446)
PMV BLD AUTO: 10.1 FL (ref 9–12.9)
POTASSIUM SERPL-SCNC: 4.2 MMOL/L (ref 3.6–5.5)
RBC # BLD AUTO: 3.83 M/UL (ref 4.2–5.4)
SARS-COV+SARS-COV-2 AG RESP QL IA.RAPID: NOTDETECTED
SODIUM SERPL-SCNC: 133 MMOL/L (ref 135–145)
SPECIMEN SOURCE: NORMAL
WBC # BLD AUTO: 8.1 K/UL (ref 4.8–10.8)

## 2021-08-09 PROCEDURE — 99203 OFFICE O/P NEW LOW 30 MIN: CPT | Performed by: ORTHOPAEDIC SURGERY

## 2021-08-09 PROCEDURE — 99217 PR OBSERVATION CARE DISCHARGE: CPT | Performed by: STUDENT IN AN ORGANIZED HEALTH CARE EDUCATION/TRAINING PROGRAM

## 2021-08-09 PROCEDURE — 80048 BASIC METABOLIC PNL TOTAL CA: CPT

## 2021-08-09 PROCEDURE — 96372 THER/PROPH/DIAG INJ SC/IM: CPT

## 2021-08-09 PROCEDURE — 93306 TTE W/DOPPLER COMPLETE: CPT | Mod: 26 | Performed by: INTERNAL MEDICINE

## 2021-08-09 PROCEDURE — 97161 PT EVAL LOW COMPLEX 20 MIN: CPT

## 2021-08-09 PROCEDURE — 87426 SARSCOV CORONAVIRUS AG IA: CPT

## 2021-08-09 PROCEDURE — 700102 HCHG RX REV CODE 250 W/ 637 OVERRIDE(OP): Performed by: STUDENT IN AN ORGANIZED HEALTH CARE EDUCATION/TRAINING PROGRAM

## 2021-08-09 PROCEDURE — G0378 HOSPITAL OBSERVATION PER HR: HCPCS

## 2021-08-09 PROCEDURE — 700111 HCHG RX REV CODE 636 W/ 250 OVERRIDE (IP): Performed by: STUDENT IN AN ORGANIZED HEALTH CARE EDUCATION/TRAINING PROGRAM

## 2021-08-09 PROCEDURE — 85025 COMPLETE CBC W/AUTO DIFF WBC: CPT

## 2021-08-09 PROCEDURE — 93306 TTE W/DOPPLER COMPLETE: CPT

## 2021-08-09 PROCEDURE — 97165 OT EVAL LOW COMPLEX 30 MIN: CPT

## 2021-08-09 PROCEDURE — 700105 HCHG RX REV CODE 258: Performed by: NURSE PRACTITIONER

## 2021-08-09 PROCEDURE — 83735 ASSAY OF MAGNESIUM: CPT

## 2021-08-09 PROCEDURE — A9270 NON-COVERED ITEM OR SERVICE: HCPCS | Performed by: STUDENT IN AN ORGANIZED HEALTH CARE EDUCATION/TRAINING PROGRAM

## 2021-08-09 RX ORDER — ACETAMINOPHEN 325 MG/1
650 TABLET ORAL EVERY 6 HOURS PRN
Qty: 30 TABLET | Refills: 0 | COMMUNITY
Start: 2021-08-09

## 2021-08-09 RX ORDER — IRBESARTAN 75 MG/1
75 TABLET ORAL DAILY
Qty: 30 TABLET | Refills: 0 | Status: SHIPPED
Start: 2021-08-10 | End: 2022-06-22 | Stop reason: SDUPTHER

## 2021-08-09 RX ORDER — SODIUM CHLORIDE, SODIUM LACTATE, POTASSIUM CHLORIDE, CALCIUM CHLORIDE 600; 310; 30; 20 MG/100ML; MG/100ML; MG/100ML; MG/100ML
INJECTION, SOLUTION INTRAVENOUS CONTINUOUS
Status: DISCONTINUED | OUTPATIENT
Start: 2021-08-09 | End: 2021-08-09

## 2021-08-09 RX ADMIN — ENOXAPARIN SODIUM 40 MG: 40 INJECTION SUBCUTANEOUS at 05:08

## 2021-08-09 RX ADMIN — LEVOTHYROXINE SODIUM 88 MCG: 0.09 TABLET ORAL at 05:09

## 2021-08-09 RX ADMIN — IRBESARTAN 75 MG: 150 TABLET ORAL at 05:09

## 2021-08-09 RX ADMIN — SODIUM CHLORIDE, POTASSIUM CHLORIDE, SODIUM LACTATE AND CALCIUM CHLORIDE: 600; 310; 30; 20 INJECTION, SOLUTION INTRAVENOUS at 11:31

## 2021-08-09 RX ADMIN — ACETAMINOPHEN 650 MG: 325 TABLET, FILM COATED ORAL at 10:44

## 2021-08-09 ASSESSMENT — COGNITIVE AND FUNCTIONAL STATUS - GENERAL
TURNING FROM BACK TO SIDE WHILE IN FLAT BAD: A LITTLE
MOVING FROM LYING ON BACK TO SITTING ON SIDE OF FLAT BED: A LITTLE
STANDING UP FROM CHAIR USING ARMS: A LITTLE
SUGGESTED CMS G CODE MODIFIER DAILY ACTIVITY: CH
SUGGESTED CMS G CODE MODIFIER MOBILITY: CK
DAILY ACTIVITIY SCORE: 24
MOBILITY SCORE: 18
MOVING TO AND FROM BED TO CHAIR: A LITTLE
WALKING IN HOSPITAL ROOM: A LITTLE
CLIMB 3 TO 5 STEPS WITH RAILING: A LITTLE

## 2021-08-09 ASSESSMENT — PATIENT HEALTH QUESTIONNAIRE - PHQ9
2. FEELING DOWN, DEPRESSED, IRRITABLE, OR HOPELESS: NOT AT ALL
SUM OF ALL RESPONSES TO PHQ9 QUESTIONS 1 AND 2: 0
1. LITTLE INTEREST OR PLEASURE IN DOING THINGS: NOT AT ALL

## 2021-08-09 ASSESSMENT — GAIT ASSESSMENTS
GAIT LEVEL OF ASSIST: SUPERVISED
DISTANCE (FEET): 100
ASSISTIVE DEVICE: FRONT WHEEL WALKER
DEVIATION: OTHER (COMMENT)

## 2021-08-09 ASSESSMENT — PAIN DESCRIPTION - PAIN TYPE
TYPE: ACUTE PAIN
TYPE: ACUTE PAIN

## 2021-08-09 ASSESSMENT — FIBROSIS 4 INDEX: FIB4 SCORE: 1.37

## 2021-08-09 ASSESSMENT — ACTIVITIES OF DAILY LIVING (ADL): TOILETING: INDEPENDENT

## 2021-08-09 NOTE — DISCHARGE SUMMARY
Discharge Summary    CHIEF COMPLAINT ON ADMISSION  Chief Complaint   Patient presents with   • Syncope   • T-5000 FALL   • Ankle Pain     Left       Reason for Admission  Syncope, left ankle pain    Admission Date  8/8/2021    CODE STATUS  Full Code    HPI & HOSPITAL COURSE  Yuki Caraballo is a 71 y.o. female with PMH of OA, hypertension, hypothyroidism, hyperlipidemia.  Who presented 8/8/2021 with complaints of syncopal episode at home.  Patient reports that she was at home and she had a sensation of something in her throat.  She then proceeded to have a coughing spell and felt lightheaded and decided to walk to her bedroom to lie down.  Patient noted that before she reached her bedroom she felt worsening lightheadedness, vision changes and felt like she was having palpitations. She saw flashing stars, however denies nausea, sweating. The next thing she remembers she woke up on the ground.  Patient was able to stand and attempted to ambulate, but she noted severe pain and deformity in her left ankle so she decided to call EMS.  On EMS arrival, patient was found to have initial hypotension and was given IV fluids.  Hypotension is now resolved.  On questioning, patient denies any prior history of the symptoms in the past.  She denies any cardiac history or history of arrhythmia.  She denies any bowel or bladder incontinence, and family/friends at bedside did not report any facial asymmetry or slurring of speech.  She does report that she has a very distant history of a small seizure 40 years ago.  She was never started on any medications and her seizures never returned.  Additionally, patient reports that she is currently taking antihypertensive medications but she did have significant weight loss that was intentional.  There is a possibility that she may be overmedicated now that she is improved her health.     ED: HR 10 1-82.  CBC unremarkable, , glucose 158, lactic acid 1.6, troponin IX, TSH 1.360.  CXR  unremarkable.  Left ankle x-ray with lateral/medial malleolus fracture with subluxation/dislocation.  Patient received NS 1.5 L, morphine 4 mg, and Zofran 4 mg.  She also has received propofol during reduction of her left ankle.  However, ankle likely slipped while splint was hardening she subsequently underwent a second reduction.  -Hypotension resolved with fluids, blood pressure well managed with a reduction in home dose of antihypertensive medication.  -Hyponatremia corrected with fluids  -No events on cardiac telemetry overnight  -Echocardiogram showed mild aortic insufficiency, otherwise within normal limits  -Orthostatic blood pressures unchanged from lying to sitting.  Patient unable to stand for exam.     This morning patient is lying in bed in no apparent distress.  She is fully alert and oriented.  She has 5/10 pain in her left lower extremity, which is well managed with Tylenol.  She denies headache, dizziness, nausea, palpitations, vision changes and any other problems.  She lives alone, however has help from her sister who will stay with her.  Patient declines opioid pain medications.    -Orthopedic consultation recommends outpatient surgery in 2-3 days, ice and crutches or walker for discharge home  -PT/OT recommend walker    Therefore, she is discharged in good and stable condition to home with close outpatient follow-up.    Discharge Date  08/09/21    FOLLOW UP ITEMS POST DISCHARGE  With Dr. Oneal as below and primary care for blood pressure management    DISCHARGE DIAGNOSES  Principal Problem (Resolved):    Syncope and collapse POA: Yes  Active Problems:    Closed fracture of left ankle with malunion POA: Yes    Hyponatremia POA: Yes    Essential hypertension POA: Yes    Acquired hypothyroidism POA: Yes    Mixed hyperlipidemia POA: Yes  Resolved Problems:    Hyperglycemia POA: Yes      FOLLOW UP  No future appointments.  Cesario Oneal M.D.  [address] [place]  [address]  [phone]    Schedule an appointment as soon as possible for a visit in 2 days  establish with primary care      MEDICATIONS ON DISCHARGE     Medication List      START taking these medications      Instructions   acetaminophen 325 MG Tabs  Commonly known as: Tylenol   Take 2 Tablets by mouth every 6 hours as needed.  Dose: 650 mg        CHANGE how you take these medications      Instructions   irbesartan 75 MG tablet  Start taking on: August 10, 2021  What changed:   · medication strength  · how much to take  · when to take this  Commonly known as: AVAPRO   Take 1 tablet by mouth every day.  Dose: 75 mg        CONTINUE taking these medications      Instructions   levothyroxine 88 MCG Tabs  Commonly known as: SYNTHROID   Take 88 mcg by mouth Every morning on an empty stomach.  Dose: 88 mcg     pravastatin 40 MG tablet  Commonly known as: PRAVACHOL   Take 40 mg by mouth every evening.  Dose: 40 mg            Allergies  Allergies   Allergen Reactions   • Tequin [Gatifloxacin]        DIET  Orders Placed This Encounter   Procedures   • Diet Order Diet: Cardiac; Miscellaneous modifications: (optional): Vegetarian     Standing Status:   Standing     Number of Occurrences:   1     Order Specific Question:   Diet:     Answer:   Cardiac [6]     Order Specific Question:   Miscellaneous modifications: (optional)     Answer:   Vegetarian [13]       ACTIVITY  As tolerated.  Non-weight bearing LEFT leg    CONSULTATIONS  Orthopedic surgery    PROCEDURES  Reduction 8/8    LABORATORY  Lab Results   Component Value Date    SODIUM 133 (L) 08/09/2021    POTASSIUM 4.2 08/09/2021    CHLORIDE 102 08/09/2021    CO2 24 08/09/2021    GLUCOSE 99 08/09/2021    BUN 18 08/09/2021    CREATININE 1.04 08/09/2021        Lab Results   Component Value Date    WBC 8.1 08/09/2021    HEMOGLOBIN 12.1 08/09/2021    HEMATOCRIT 34.9 (L) 08/09/2021    PLATELETCT 239 08/09/2021      Glycohemoglobin   Date Value Ref Range Status   08/08/2021 5.5 4.0 -  5.6 % Final     Comment:     Increased risk for diabetes:  5.7 -6.4%  Diabetes:  >6.4%  Glycemic control for adults with diabetes:  <7.0%    The above interpretations are per ADA guidelines.  Diagnosis  of diabetes mellitus on the basis of elevated Hemoglobin A1c  should be confirmed by repeating the Hb A1c test.         EC-ECHOCARDIOGRAM COMPLETE W/O CONT  Transthoracic  Echo Report    Echocardiography Laboratory    CONCLUSIONS  Normal left ventricular size, wall thickness, systolic, and diastolic   function.  Normal right ventricular size and systolic function.  Mild aortic insufficiency.  No pericardial effusion seen.    No prior study is available for comparison.     ROD PRASAD  Exam Date:         2021                      08:08  Exam Location:     Inpatient  Priority:          Routine    Ordering Physician:        ASHLI SIMENTAL  Referring Physician:       [id number], IP  Sonographer:               Marino Silver RDCS    Age:    71     Gender:    F  MRN:    [id number]  :    1950  BSA:    1.76   Ht (in):    63     Wt (lb):    160  Exam Type:     Complete    Indications:     Syncope  ICD Codes:       780.2    CPT Codes:       15305    BP:   147    /   65     HR:  Technical Quality:       Good    MEASUREMENTS  (Male / Female) Normal Values  2D ECHO  LV Diastolic Diameter PLAX        3.9 cm                4.2 - 5.9 / 3.9 - 5.3   cm  LV Systolic Diameter PLAX         2.5 cm                2.1 - 4.0 cm  IVS Diastolic Thickness           1 cm                    LVPW Diastolic Thickness          0.83 cm                 LVOT Diameter                     1.8 cm                  Estimated LV Ejection Fraction    65 %                    LV Ejection Fraction MOD BP       64.8 %                >= 55  %  LV Ejection Fraction MOD 4C       69.6 %                  LV Ejection Fraction MOD 2C       57.7 %                  IVC Diameter                      2 cm                      DOPPLER  AV Peak Velocity                   1.5 m/s                 AV Peak Gradient                  8.9 mmHg                AV Mean Gradient                  5 mmHg                  LVOT Peak Velocity                1.1 m/s                 AV Area Cont Eq vti               2 cm2                   Mitral E Point Velocity           0.7 m/s                 Mitral E to A Ratio               0.77                    MV Pressure Half Time             84.6 ms                 MV Area PHT                       2.6 cm2                 MV Deceleration Time              292 ms                  TR Peak Velocity                  236 cm/s                MV E' Velocity                    9.9 cm/s                  * Indicates values subject to auto-interpretation  LV EF:  65    %    FINDINGS  Left Ventricle  Normal left ventricular chamber size. Normal left ventricular wall   thickness. Normal left ventricular systolic function. Left ventricular   ejection fraction is visually estimated to be 65%. Normal regional wall   motion. Normal diastolic function.    Right Ventricle  Normal right ventricular size and systolic function.    Right Atrium  Normal right atrial size. Normal inferior vena cava size without   inspiratory collapse.    Left Atrium  Normal left atrial size. Left atrial volume index is 29  mL/sq m.    Mitral Valve  Structurally normal mitral valve without significant stenosis or   regurgitation.    Aortic Valve  Structurally normal aortic valve. No aortic stenosis. Mild aortic   insufficiency.    Tricuspid Valve  Structurally normal tricuspid valve. No tricuspid stenosis. Mild   tricuspid regurgitation. Right atrial pressure is estimated to be 8   mmHg. Estimated right ventricular systolic pressure  is 35 mmHg.    Pulmonic Valve  The pulmonic valve is not well visualized.    Pericardium  No pericardial effusion seen.    Aorta  Normal aortic root for body surface area. Ascending aorta diameter is   3.3 cm.    Gregg Douglas MD  (Electronically Signed)  Final  Date:     09 August 2021                   09:13      Total time of the discharge process exceeds 31 minutes.    DEAN Skaggs.

## 2021-08-09 NOTE — THERAPY
Physical Therapy   Initial Evaluation     Patient Name: Yuki Caraballo  Age:  71 y.o., Sex:  female  Medical Record #: [id number]  Today's Date: 8/9/2021     Precautions: Fall Risk, Non Weight Bearing Left Lower Extremity  Comments: non-op ankle fx    Assessment  Patient is 71 y.o. female admitted after syncope with L trimalleolar ankle fracture/dislocation (reduced and undergoing surgery outpatient once edema decreases). Pt educated on weight bearing status, elevation, and ice. Pt was able to ambulate with FWW and SPV. Instructed on stair negotiation with min assist from family. No further acute PT needs.     Plan    Recommend Physical Therapy for Evaluation only     DC Equipment Recommendations: Front-Wheel Walker  Discharge Recommendations: Recommend outpatient physical therapy services to address higher level deficits (post op OP PT and min assist with step negotiation into home)          08/09/21 1326   Prior Living Situation   Prior Services Home-Independent   Housing / Facility 1 Story House   Steps Into Home 2  (platform step)   Steps In Home 0   Bathroom Set up Walk In Shower;Shower Chair   Equipment Owned Tub / Shower Seat   Lives with - Patient's Self Care Capacity Alone and Able to Care For Self   Comments family support   Prior Level of Functional Mobility   Bed Mobility Independent   Transfer Status Independent   Ambulation Independent   Distance Ambulation (Feet)   (community)   Assistive Devices Used None   Stairs Independent   Comments indpendent prior   History of Falls   History of Falls Yes   Date of Last Fall   (syncope)   Cognition    Cognition / Consciousness WDL   Comments cooperative, pleasant    Active ROM Lower Body    Active ROM Lower Body  X   Comments L ankle immobilized   Strength Lower Body   Lower Body Strength  X   Comments L ankle immobilized   Gait Analysis   Gait Level Of Assist Supervised   Assistive Device Front Wheel Walker   Distance (Feet) 100   # of Times Distance was  Traveled 2   Deviation Other (Comment)  (hop/NWB)   # of Stairs Climbed 1   Level of Assist with Stairs Minimal Assist   Weight Bearing Status NWB L LE   Comments sister present and expalined guarding for step negotiation   Bed Mobility    Supine to Sit Supervised   Sit to Supine Supervised   Scooting Supervised   Rolling Supervised   Functional Mobility   Sit to Stand Supervised   Bed, Chair, Wheelchair Transfer Supervised   Toilet Transfers Supervised   Transfer Method Stand Step   Mobility in room and hallway   Education Group   Education Provided Role of Physical Therapist;Weight Bearing Status;Stair Training   Role of Physical Therapist Patient Response Patient;Acceptance;Explanation;Verbal Demonstration   Stair Training Patient Response Patient;Family;Acceptance;Explanation;Verbal Demonstration   Weight Bearing Status Patient Response Patient;Acceptance;Family;Explanation;Verbal Demonstration   Anticipated Discharge Equipment and Recommendations   DC Equipment Recommendations Front-Wheel Walker   Discharge Recommendations Recommend outpatient physical therapy services to address higher level deficits  (post op)

## 2021-08-09 NOTE — PROGRESS NOTES
"   Orthopaedic PA Progress Note    Interval changes:71 BIBA last night with syncopal and associalted closed L trimalleolar ankle fracture/dislocation, successfully reduced in ED.    ROS - Patient denies any new issues. No chest pain, dyspnea, or fever.  Pain well controlled.    /60   Pulse (!) 56   Temp 37.2 °C (99 °F) (Temporal)   Resp 16   Ht 1.575 m (5' 2\")   Wt 68.5 kg (151 lb)   SpO2 95%     Patient seen and examined  No acute distress  Breathing non labored  RRR  Prox compartments soft  Splint intact  Toes DF/PF/SILT/ICR      Recent Labs     08/08/21  1930 08/09/21  0447   WBC 9.0 8.1   RBC 4.15* 3.83*   HEMOGLOBIN 12.8 12.1   HEMATOCRIT 37.4 34.9*   MCV 90.1 91.1   MCH 30.8 31.6   MCHC 34.2 34.7   RDW 43.8 45.1   PLATELETCT 279 239   MPV 10.1 10.1     Active Hospital Problems    Diagnosis    • Syncope and collapse [R55]    • Closed fracture of left ankle with malunion [S82.892P]    • Hyponatremia [E87.1]    • Hyperglycemia [R73.9]    • Essential hypertension [I10]    • Acquired hypothyroidism [E03.9]    • Mixed hyperlipidemia [E78.2]        Assessment/Plan:  L ankle fx/disloc  Wt bearing status - NWB LLE below the kneekPT/OT-initiated  Wound care:.Ortho team to manage wound care beneath all splints. Call [phone] if concerns    Pt held in CDU overnight pending cards workup which is apparently benign  OK for home, needs appointment Dr. Oneal 2-3 days for skin exam and surgery scheduling.  ICE, elevation in the meantime. Recoomend PT assess for crutches vs walker prior to DC home  "

## 2021-08-09 NOTE — THERAPY
"Occupational Therapy   Initial Evaluation     Patient Name: Yuki Caraballo  Age:  71 y.o., Sex:  female  Medical Record #: [id number]  Today's Date: 8/9/2021     Precautions  Precautions: (P) Non Weight Bearing Left Lower Extremity  Comments: (P) non-op ankle fx    Assessment  Patient is 71 y.o. female admitted for syncope with closed L trimalleolar fracture reduced in ED, plans to have surgery at later time for now patient NWB LLE. Pt lives alone in a [facility] at baseline, normally independent with all ADLs and mobility without an AD. Pt has multiple family members who live nearby and can assist as needed, including sister at bedside who is willing to assist. Pt able to complete all mobility and ADLs with supervision, discussed WB'ing restrictions, equipment needs, as well as navigating bathroom/shower set up with assist from family which patient is agreeable and confident with. Pt safe to discharge home with family assistance, will defer ambulatory AD to PT.    Plan    Recommend Occupational Therapy for Evaluation only.    DC Equipment Recommendations: (P) None  Discharge Recommendations: (P) Anticipate that the patient will have no further occupational therapy needs after discharge from the hospital     Subjective    \"I want to go home and eat\"     Objective       08/09/21 1321   Prior Living Situation   Prior Services Home-Independent   Housing / Facility 1 Story House   Steps Into Home 2  (not consecutive)   Steps In Home 0   Bathroom Set up Walk In Shower;Shower Chair   Equipment Owned Tub / Shower Seat   Lives with - Patient's Self Care Capacity Alone and Able to Care For Self   Comments Multiple family members live nearby and can assist   Prior Level of ADL Function   Self Feeding Independent   Grooming / Hygiene Independent   Bathing Independent   Dressing Independent   Toileting Independent   Prior Level of IADL Function   Medication Management Independent   Laundry Independent   Kitchen Mobility Independent "   Finances Independent   Home Management Independent   Shopping Independent   Prior Level Of Mobility Independent Without Device in Community   Driving / Transportation Driving Independent   History of Falls   History of Falls Yes   Date of Last Fall   (reason for admit, syncopal episode)   Precautions   Precautions Non Weight Bearing Left Lower Extremity   Comments non-op ankle fx   Pain 0 - 10 Group   Therapist Pain Assessment Post Activity Pain Same as Prior to Activity;Nurse Notified   Non Verbal Descriptors   Non Verbal Scale  Calm   Cognition    Cognition / Consciousness WDL   Comments Very pleasant, cooperative, receptive to therapy   Active ROM Upper Body   Active ROM Upper Body  WDL   Dominant Hand Right   Strength Upper Body   Upper Body Strength  WDL   Sensation Upper Body   Upper Extremity Sensation  WDL   Upper Body Muscle Tone   Upper Body Muscle Tone  WDL   Neurological Concerns   Neurological Concerns No   Coordination Upper Body   Coordination WDL   Balance Assessment   Sitting Balance (Static) Fair   Sitting Balance (Dynamic) Fair   Standing Balance (Static) Fair   Standing Balance (Dynamic) Fair   Weight Shift Sitting Fair   Weight Shift Standing Fair   Comments w/ FWW   Bed Mobility    Supine to Sit Supervised   Sit to Supine Supervised   Scooting Supervised   Rolling Supervised   ADL Assessment   Grooming Supervision;Standing   Upper Body Dressing Supervision   Lower Body Dressing Supervision   Toileting Supervision   How much help from another person does the patient currently need...   Putting on and taking off regular lower body clothing? 4   Bathing (including washing, rinsing, and drying)? 4   Toileting, which includes using a toilet, bedpan, or urinal? 4   Putting on and taking off regular upper body clothing? 4   Taking care of personal grooming such as brushing teeth? 4   Eating meals? 4   6 Clicks Daily Activity Score 24   Functional Mobility   Sit to Stand Supervised   Bed, Chair,  Wheelchair Transfer Supervised   Toilet Transfers Supervised   Transfer Method Stand Step   Mobility bed mobility, hallway, bathroom mobility, back to bed   Comments w/ FWW   Visual Perception   Visual Perception  Not Tested   Edema / Skin Assessment   Edema / Skin  Not Assessed   Activity Tolerance   Sitting in Chair 5 min  (toilet)   Sitting Edge of Bed 5 min   Standing 10 min   Education Group   Education Provided Role of Occupational Therapist;Weight Bearing Precautions;Adaptive Equipment   Role of Occupational Therapist Patient Response Patient;Family;Acceptance;Explanation   Adaptive Equipment Patient Response Patient;Family;Acceptance;Explanation;Action Demonstration   Weight Bearing Precautions Patient Response Patient;Acceptance;Explanation;Action Demonstration   Problem List   Problem List None   Interdisciplinary Plan of Care Collaboration   IDT Collaboration with  Nursing;Physical Therapist   Patient Position at End of Therapy In Bed;Call Light within Reach;Tray Table within Reach;Phone within Reach   Collaboration Comments RN updated

## 2021-08-09 NOTE — ASSESSMENT & PLAN NOTE
Dr. Oneal will see patient on admitted  We will need syncopal work-up prior to any correction of left ankle dislocation/fracture

## 2021-08-09 NOTE — H&P
Hospital Medicine History & Physical Note    Date of Service  8/8/2021    Primary Care Physician  Pcp Pt States None    Consultants  orthopedics    Specialist Names: Dr. Oneal    Code Status  Full Code    Chief Complaint  Chief Complaint   Patient presents with   • Syncope   • T-5000 FALL   • Ankle Pain     Left       History of Presenting Illness  Yuki Caraballo is a 71 y.o. female with PMH of OA, hypertension, hypothyroidism, hyperlipidemia.  Who presented 8/8/2021 with complaints of syncopal episode at home.  Patient reports that she was at home and she had a sensation of something in her throat.  She then proceeded to have a coughing spell and felt lightheaded and decided to walk to her bedroom to lie down.  Patient noted that before she reached her bedroom she felt worsening lightheadedness and felt like she was having palpitations.  The she remembers she is was waking up on the ground.  Patient was able to stand and attempted to ambulate, but she noted severe pain and deformity in her left ankle so she decided to call EMS.  On EMS arrival, patient was found to have initial hypotension and was given IV fluids.  Hypotension is now resolved.  On questioning, patient denies any prior history of the symptoms in the past.  She denies any cardiac history or history of arrhythmia.  She denies any bowel or bladder incontinence, and family/friends at bedside did not report any facial asymmetry or slurring of speech.  She does report that she has a very distant history of a small seizure 40 years ago.  She was never started on any medications and her seizures never returned.  Additionally, patient reports that she is currently taking antihypertensive medications but she did have significant weight loss that was intentional.  There is a possibility that she may be overmedicated now that she is improved her health.    ED: HR 10 1-82.  CBC unremarkable, , glucose 158, lactic acid 1.6, troponin IX, TSH 1.360.   CXR unremarkable.  Left ankle x-ray with lateral/medial malleolus fracture with subluxation/dislocation.  Patient received NS 1.5 L, morphine 4 mg, and Zofran 4 mg.  She also has received propofol during reduction of her left ankle.  However, ankle likely slipped while splint was hardening, currently undergoing second reduction.    I discussed the plan of care with patient and family.    Review of Systems  Review of Systems   Constitutional: Negative for chills, fever and weight loss.   HENT: Negative for hearing loss and sore throat.    Eyes: Negative for blurred vision and pain.   Respiratory: Negative for cough, hemoptysis, shortness of breath and wheezing.    Cardiovascular: Positive for palpitations. Negative for chest pain, orthopnea and leg swelling.   Gastrointestinal: Negative for abdominal pain, blood in stool, constipation, diarrhea, nausea and vomiting.   Genitourinary: Negative for dysuria and hematuria.   Musculoskeletal: Positive for falls and joint pain (Left foot pain). Negative for myalgias.   Skin: Negative for rash.   Neurological: Positive for seizures and loss of consciousness. Negative for dizziness and weakness.       Past Medical History   has a past medical history of Arthritis, Cancer (CMS-HCC), Heart burn, Hemorrhagic disorder (CMS-HCC), and Hypertension.    Surgical History   has a past surgical history that includes gyn surgery (2003-04); gyn surgery (1975); other (1985); other (2012); septoplasty (N/A, 6/2/2017); and turbinate reduction (Bilateral, 6/2/2017).     Family History  No family history of diabetes, heart disease, cancer  Family history reviewed with patient. There is no family history that is pertinent to the chief complaint.     Social History   reports that she has never smoked. She does not have any smokeless tobacco history on file. She reports current alcohol use. She reports that she does not use drugs.    Allergies  Allergies   Allergen Reactions   • Tequin  [Gatifloxacin]        Medications  Prior to Admission Medications   Prescriptions Last Dose Informant Patient Reported? Taking?   Probiotic Product (PROBIOTIC PO)  Patient Yes No   Sig: Take  by mouth 1 time daily as needed.   cephALEXin (KEFLEX) 500 MG Cap   No No   Sig: Take 1 Cap by mouth 2 times a day.   hydrocodone-acetaminophen (NORCO) 5-325 MG Tab per tablet   No No   Sig: Take 1-2 Tabs by mouth every four hours as needed.   levothyroxine (SYNTHROID) 88 MCG Tab  Patient Yes No   Sig: Take 88 mcg by mouth Every morning on an empty stomach.   pravastatin (PRAVACHOL) 10 MG Tab  Patient Yes No   Sig: Take 10 mg by mouth every evening.   therapeutic multivitamin-minerals (THERAGRAN-M) Tab  Patient Yes No   Sig: Take 1 Tab by mouth every day.      Facility-Administered Medications: None       Physical Exam  Temp:  [36.8 °C (98.3 °F)] 36.8 °C (98.3 °F)  Pulse:  [] 90  Resp:  [16-94] 16  BP: (100-147)/(48-71) 147/65  SpO2:  [90 %-99 %] 94 %    Physical Exam  Vitals and nursing note reviewed.   Constitutional:       General: She is not in acute distress.     Appearance: Normal appearance. She is not ill-appearing.   HENT:      Mouth/Throat:      Mouth: Mucous membranes are moist.   Eyes:      Extraocular Movements: Extraocular movements intact.   Cardiovascular:      Rate and Rhythm: Normal rate and regular rhythm.      Pulses: Normal pulses.      Heart sounds: Normal heart sounds. No murmur heard.   No gallop.    Pulmonary:      Effort: Pulmonary effort is normal. No respiratory distress.      Breath sounds: No wheezing, rhonchi or rales.   Abdominal:      General: Abdomen is flat. Bowel sounds are normal.      Palpations: Abdomen is soft.      Tenderness: There is no abdominal tenderness.   Musculoskeletal:         General: No deformity. Normal range of motion.      Right lower leg: No edema.      Left lower leg: No edema.   Skin:     General: Skin is warm and dry.      Coloration: Skin is not jaundiced.       Findings: No rash.   Neurological:      General: No focal deficit present.      Mental Status: She is alert and oriented to person, place, and time.      Motor: No weakness.   Psychiatric:         Mood and Affect: Mood normal.         Laboratory:  Recent Labs     08/08/21 1930   WBC 9.0   RBC 4.15*   HEMOGLOBIN 12.8   HEMATOCRIT 37.4   MCV 90.1   MCH 30.8   MCHC 34.2   RDW 43.8   PLATELETCT 279   MPV 10.1     Recent Labs     08/08/21 1930   SODIUM 134*   POTASSIUM 3.6   CHLORIDE 99   CO2 22   GLUCOSE 158*   BUN 18   CREATININE 1.19   CALCIUM 9.3     Recent Labs     08/08/21 1930   ALTSGPT 20   ASTSGOT 20   ALKPHOSPHAT 60   TBILIRUBIN 0.4   GLUCOSE 158*         No results for input(s): NTPROBNP in the last 72 hours.      Recent Labs     08/08/21 1930   TROPONINT 9       Imaging:  DX-ANKLE 2- VIEWS LEFT   Final Result         1.  Lateral and medial malleolus fractures with lateral subluxation/dislocation of the ankle mortise joint.      DX-CHEST-PORTABLE (1 VIEW)   Final Result         1.  No acute cardiopulmonary disease.      DX-ANKLE 2- VIEWS LEFT    (Results Pending)   EC-ECHOCARDIOGRAM COMPLETE W/O CONT    (Results Pending)       EKG:  I have personally reviewed the images and compared with prior images.    Assessment/Plan:  I anticipate this patient is appropriate for observation status at this time.    * Syncope and collapse- (present on admission)  Assessment & Plan  Syncopal episode today  No prior history of syncope in the past  - Telemetry monitoring  - Fall precautions  - Check orthostatics when able to bear weight on left foot  - TTE  -Reports she had have her thyroid gland removed, will check TSH level with reflex T4      Closed fracture of left ankle with malunion- (present on admission)  Assessment & Plan  Dr. Oneal will see patient on admitted  We will need syncopal work-up prior to any correction of left ankle dislocation/fracture    Mixed hyperlipidemia- (present on admission)  Assessment &  Plan  C/w home Pravastatin 40mg qd    Acquired hypothyroidism- (present on admission)  Assessment & Plan  Continue with Synthroid 88 mcg  TSH with reflex T4    Essential hypertension- (present on admission)  Assessment & Plan  Noted to be hypotensive on EMS arrival  Did require IV fluids  Reports significant weight loss that was intentional  Possibly need adjustment to home antihypertensive medications  -Patient takes irbesartan 150 mg daily at home, will reduce to 75 mg and monitor    Hyperglycemia- (present on admission)  Assessment & Plan  A1c  Monitoring consider ISS if remains elevated    Hyponatremia- (present on admission)  Assessment & Plan  S/p NS 1.5 L  -Continue to monitor administer IV fluids as needed      VTE prophylaxis: enoxaparin ppx

## 2021-08-09 NOTE — ED TRIAGE NOTES
"Chief Complaint   Patient presents with   • Syncope   • T-5000 FALL         Left ankle pain      BIB [facility], pt had unwitnessed syncopal event.  Pt states her \"eyes were blurry then I remember waking up on the floor\" , on EMS arrival pt was hypotensive for approx 15mins.  Denies any head pain or blood thinners, denies any recent illness, hx of HTN, took prescribed meds this am.  C/o Left ankle pain, cms intact.  EMS applied a ace wrap to the area.  Pt also has reddened rash on the abd that is not normal for her .  Pt has pfizer covid vaccine     PPE Note: I personally donned full PPE for all patient encounters during this visit, including a N95 respirator mask, gloves, and goggles.    "

## 2021-08-09 NOTE — PROGRESS NOTES
Assumed patient care. Will keep her NPO, will get Echo done. Seen by Dr Oneal possible surgery plan.

## 2021-08-09 NOTE — ASSESSMENT & PLAN NOTE
Syncopal episode today  No prior history of syncope in the past  - Telemetry monitoring  - Fall precautions  - Check orthostatics when able to bear weight on left foot  - TTE  -Reports she had have her thyroid gland removed, will check TSH level with reflex T4

## 2021-08-09 NOTE — ED PROVIDER NOTES
"ED Provider Note    ED Provider Note    Scribed for Cesar Mtz MD by Cesar Mtz M.D.. 8/8/2021, 8:17 PM.    Primary care provider: Pcp Pt States None  Means of arrival: Private  History obtained from: Patient  History limited by: None    CHIEF COMPLAINT  Chief Complaint   Patient presents with   • Syncope   • T-5000 FALL   • Ankle Pain     Left       HPI  Yuki Caraballo is a 71 y.o. female who presents to the Emergency Department for evaluation of syncopal episode and resulting ankle injury.  Patient relates after she noticed a sensation of \"something in my throat\", she began coughing, she notes after her coughing spell she felt lightheaded and walked to her bedroom to lie down.  Patient notes before she got to her bed she felt worsening lightheaded sensation, felt her heart pounding, then simply woke up on the ground.  Patient notes she actually did get up and briefly walk but then noted severe pain and deformity to her left ankle.  Patient unable to stand or ambulate since, transported by EMS who applied an Ace wrap, and arrives with persistent pain to the left ankle.  Patient found to be hypotensive by EMS, initial blood pressure one 3/48 in the ED after fluid resuscitation by medics.  Patient is not have the hypertensives.  She does tell me she has been exercising more and has lost some weight; certainly it is possible she may be overmedicated with regard to her antihypertensive and hypotension noted today given the recent weight loss.  She notes otherwise no preceding nausea no vomiting no diarrhea.  She relates normal water intake today, and no fever and no vomiting.  She is not anticoagulated, and denies any other injury.    REVIEW OF SYSTEMS  Pertinent positives include syncope with preceding dizziness and palpitations, left ankle injury with inability ambulate and obvious deformity. Pertinent negatives include no fever, no vomiting, no focal numbness or weakness, no " Learning About Type 2 Diabetes  What is type 2 diabetes? Insulin is a hormone that helps your body use sugar from your food as energy. Type 2 diabetes happens when your body can't use insulin the right way. Over time, the pancreas can't make enough insulin. If you don't have enough insulin, too much sugar stays in your blood. If you are overweight, get little or no exercise, or have type 2 diabetes in your family, you are more likely to have problems with the way insulin works in your body.  Americans, Hispanics, Native Americans,  Americans, and Pacific Islanders have a higher risk for type 2 diabetes. Type 2 diabetes can be prevented or delayed with a healthy lifestyle, which includes staying at a healthy weight, making smart food choices, and getting regular exercise. What can you expect with type 2 diabetes? Imer Burks keep hearing about how important it is to keep your blood sugar within a target range. That's because over time, high blood sugar can lead to serious problems. It can:  · Harm your eyes, nerves, and kidneys. · Damage your blood vessels, leading to heart disease and stroke. · Reduce blood flow and cause nerve damage to parts of your body, especially your feet. This can cause slow healing and pain when you walk. · Make your immune system weak and less able to fight infections. When people hear the word \"diabetes,\" they often think of problems like these. But daily care and treatment can help prevent or delay these problems. The goal is to keep your blood sugar in a target range. That's the best way to reduce your chance of having more problems from diabetes. What are the symptoms? Some people who have type 2 diabetes may not have any symptoms early on. Many people with the disease don't even know they have it at first. But with time, diabetes starts to cause symptoms. You experience most symptoms of type 2 diabetes when your blood sugar is either too high or too low.   The "anticoagulation.  All other systems reviewed and negative.    PAST MEDICAL HISTORY   has a past medical history of Arthritis, Cancer (CMS-HCC), Heart burn, Hemorrhagic disorder (CMS-HCC), and Hypertension.    SURGICAL HISTORY   has a past surgical history that includes gyn surgery (2003-04); gyn surgery (1975); other (1985); other (2012); septoplasty (N/A, 6/2/2017); and turbinate reduction (Bilateral, 6/2/2017).    SOCIAL HISTORY  Social History     Tobacco Use   • Smoking status: Never Smoker   Substance Use Topics   • Alcohol use: Yes     Comment: 4-5 per month   • Drug use: No      Social History     Substance and Sexual Activity   Drug Use No       FAMILY HISTORY  No family history on file.    CURRENT MEDICATIONS  Home Medications     Reviewed by Georgiana Centeno R.N. (Registered Nurse) on 08/08/21 at 2343  Med List Status: Complete   Medication Last Dose Status   irbesartan (AVAPRO) 150 MG Tab  Active   levothyroxine (SYNTHROID) 88 MCG Tab  Active   pravastatin (PRAVACHOL) 40 MG tablet  Active                ALLERGIES  Allergies   Allergen Reactions   • Tequin [Gatifloxacin]        PHYSICAL EXAM  VITAL SIGNS: /60   Pulse (!) 56   Temp 37.2 °C (99 °F) (Temporal)   Resp 16   Ht 1.575 m (5' 2\")   Wt 68.5 kg (151 lb)   SpO2 95%   Breastfeeding No   BMI 27.62 kg/m²     General: Alert, no acute distress  Skin: Warm, dry, mildly pale, otherwise normal for ethnicity  Head: Normocephalic, atraumatic  Neck: Trachea midline, no tenderness  Eye: PERRL, normal conjunctiva, extraocular movements intact.  ENMT: Oral mucosa moist, no pharyngeal erythema or exudate  Cardiovascular: Regular rate and rhythm, No murmur, Normal peripheral perfusion  Respiratory: Lungs CTA, respirations are non-labored, breath sounds are equal  Gastrointestinal: Soft, nontender, non distended  Musculoskeletal: Moderate swelling with significant deformity noted to the left ankle, range of motion at the ankle is essentially nil secondary " most common symptoms of high blood sugar include:  · Thirst.  · Frequent urination. · Weight loss. · Blurry vision. The symptoms of low blood sugar include:  · Sweating. · Shakiness. · Weakness. · Hunger. · Confusion. How can you prevent type 2 diabetes? The best way to prevent or delay type 2 diabetes is to adopt healthy habits, which include:  · Staying at a healthy weight. · Exercising regularly. · Eating healthy foods. How is type 2 diabetes treated? If you have type 2 diabetes, here are the most important things you can do. · Take your diabetes medicines. · Check your blood sugar as often as your doctor recommends. Also, get a hemoglobin A1c test at least every 6 months. · Try to eat a variety of foods and to spread carbohydrate throughout the day. Carbohydrate raises blood sugar higher and more quickly than any other nutrient does. Carbohydrate is found in sugar, breads and cereals, fruit, starchy vegetables such as potatoes and corn, and milk and yogurt. · Get at least 30 minutes of exercise on most days of the week. Walking is a good choice. You also may want to do other activities, such as running, swimming, cycling, or playing tennis or team sports. If your doctor says it's okay, do muscle-strengthening exercises at least 2 times a week. · See your doctor for checkups and tests on a regular schedule. · If you have high blood pressure or high cholesterol, take the medicines as prescribed by your doctor. · Do not smoke. Smoking can make health problems worse. This includes problems you might have with type 2 diabetes. If you need help quitting, talk to your doctor about stop-smoking programs and medicines. These can increase your chances of quitting for good. Follow-up care is a key part of your treatment and safety. Be sure to make and go to all appointments, and call your doctor if you are having problems.  It's also a good idea to know your test results and keep a list of the medicines to severe pain.  2+ DP pulse, brisk cap refill.  Distal function with regard to dorsal plantarflexion of the toes is fully intact, sensation light touch is fully intact and symmetrical bilaterally.  Otherwise cranial nerves II through grossly intact.  Upper extremity strength sensation are 5 x 5 and symmetrical bilaterally.  The right lower extremity is 5 x 5 with regard to strength and sensation.  Neurological: Alert and oriented to person, place, time, and situation  Lymphatics: No lymphadenopathy  Psychiatric: Cooperative, appropriate mood & affect      DIAGNOSTIC STUDIES/PROCEDURES    LABS  Results for orders placed or performed during the hospital encounter of 08/08/21   EC-ECHOCARDIOGRAM COMPLETE W/O CONT   Result Value Ref Range    Eject.Frac. MOD BP 64.8     Eject.Frac. MOD 4C 69.63     Eject.Frac. MOD 2C 57.68     Left Ventrical Ejection Fraction 65    CBC with Differential   Result Value Ref Range    WBC 9.0 4.8 - 10.8 K/uL    RBC 4.15 (L) 4.20 - 5.40 M/uL    Hemoglobin 12.8 12.0 - 16.0 g/dL    Hematocrit 37.4 37.0 - 47.0 %    MCV 90.1 81.4 - 97.8 fL    MCH 30.8 27.0 - 33.0 pg    MCHC 34.2 33.6 - 35.0 g/dL    RDW 43.8 35.9 - 50.0 fL    Platelet Count 279 164 - 446 K/uL    MPV 10.1 9.0 - 12.9 fL    Neutrophils-Polys 53.30 44.00 - 72.00 %    Lymphocytes 39.50 22.00 - 41.00 %    Monocytes 5.80 0.00 - 13.40 %    Eosinophils 0.80 0.00 - 6.90 %    Basophils 0.30 0.00 - 1.80 %    Immature Granulocytes 0.30 0.00 - 0.90 %    Nucleated RBC 0.00 /100 WBC    Neutrophils (Absolute) 4.81 2.00 - 7.15 K/uL    Lymphs (Absolute) 3.57 1.00 - 4.80 K/uL    Monos (Absolute) 0.52 0.00 - 0.85 K/uL    Eos (Absolute) 0.07 0.00 - 0.51 K/uL    Baso (Absolute) 0.03 0.00 - 0.12 K/uL    Immature Granulocytes (abs) 0.03 0.00 - 0.11 K/uL    NRBC (Absolute) 0.00 K/uL   Complete Metabolic Panel (CMP)   Result Value Ref Range    Sodium 134 (L) 135 - 145 mmol/L    Potassium 3.6 3.6 - 5.5 mmol/L    Chloride 99 96 - 112 mmol/L    Co2 22 20 - 33  you take. Where can you learn more? Go to http://solomon-adams.info/. Enter M683 in the search box to learn more about \"Learning About Type 2 Diabetes. \"  Current as of: December 7, 2017  Content Version: 11.8  © 0818-6643 [employer]wise, [employer]. Care instructions adapted under license by [employer] (which disclaims liability or warranty for this information). If you have questions about a medical condition or this instruction, always ask your healthcare professional. [address] any warranty or liability for your use of this information. mmol/L    Anion Gap 13.0 7.0 - 16.0    Glucose 158 (H) 65 - 99 mg/dL    Bun 18 8 - 22 mg/dL    Creatinine 1.19 0.50 - 1.40 mg/dL    Calcium 9.3 8.5 - 10.5 mg/dL    AST(SGOT) 20 12 - 45 U/L    ALT(SGPT) 20 2 - 50 U/L    Alkaline Phosphatase 60 30 - 99 U/L    Total Bilirubin 0.4 0.1 - 1.5 mg/dL    Albumin 3.8 3.2 - 4.9 g/dL    Total Protein 6.9 6.0 - 8.2 g/dL    Globulin 3.1 1.9 - 3.5 g/dL    A-G Ratio 1.2 g/dL   Troponin   Result Value Ref Range    Troponin T 9 6 - 19 ng/L   TSH (for screening thyroid dysfunction)   Result Value Ref Range    TSH 1.360 0.380 - 5.330 uIU/mL   LACTIC ACID   Result Value Ref Range    Lactic Acid 1.6 0.5 - 2.0 mmol/L   ESTIMATED GFR   Result Value Ref Range    GFR If  54 (A) >60 mL/min/1.73 m 2    GFR If Non  44 (A) >60 mL/min/1.73 m 2   SARS-COV Antigen BRYAN: Collect dry nasal swab   Result Value Ref Range    SARS-CoV-2 Source Nasal Swab     SARS-COV ANTIGEN BRYAN NotDetected Not-Detected   HEMOGLOBIN A1C   Result Value Ref Range    Glycohemoglobin 5.5 4.0 - 5.6 %    Est Avg Glucose 111 mg/dL   CBC with Differential   Result Value Ref Range    WBC 8.1 4.8 - 10.8 K/uL    RBC 3.83 (L) 4.20 - 5.40 M/uL    Hemoglobin 12.1 12.0 - 16.0 g/dL    Hematocrit 34.9 (L) 37.0 - 47.0 %    MCV 91.1 81.4 - 97.8 fL    MCH 31.6 27.0 - 33.0 pg    MCHC 34.7 33.6 - 35.0 g/dL    RDW 45.1 35.9 - 50.0 fL    Platelet Count 239 164 - 446 K/uL    MPV 10.1 9.0 - 12.9 fL    Neutrophils-Polys 69.60 44.00 - 72.00 %    Lymphocytes 23.40 22.00 - 41.00 %    Monocytes 6.30 0.00 - 13.40 %    Eosinophils 0.20 0.00 - 6.90 %    Basophils 0.10 0.00 - 1.80 %    Immature Granulocytes 0.40 0.00 - 0.90 %    Nucleated RBC 0.00 /100 WBC    Neutrophils (Absolute) 5.60 2.00 - 7.15 K/uL    Lymphs (Absolute) 1.88 1.00 - 4.80 K/uL    Monos (Absolute) 0.51 0.00 - 0.85 K/uL    Eos (Absolute) 0.02 0.00 - 0.51 K/uL    Baso (Absolute) 0.01 0.00 - 0.12 K/uL    Immature Granulocytes (abs) 0.03 0.00 - 0.11 K/uL     NRBC (Absolute) 0.00 K/uL   Basic Metabolic Panel (BMP)   Result Value Ref Range    Sodium 133 (L) 135 - 145 mmol/L    Potassium 4.2 3.6 - 5.5 mmol/L    Chloride 102 96 - 112 mmol/L    Co2 24 20 - 33 mmol/L    Glucose 99 65 - 99 mg/dL    Bun 18 8 - 22 mg/dL    Creatinine 1.04 0.50 - 1.40 mg/dL    Calcium 9.0 8.5 - 10.5 mg/dL    Anion Gap 7.0 7.0 - 16.0   Magnesium   Result Value Ref Range    Magnesium 1.8 1.5 - 2.5 mg/dL   ESTIMATED GFR   Result Value Ref Range    GFR If African American >60 >60 mL/min/1.73 m 2    GFR If Non African American 52 (A) >60 mL/min/1.73 m 2   EKG   Result Value Ref Range    Report       [facility] Emergency Dept.    Test Date:  2021  Pt Name:    ROD PRASAD                Department: ER  MRN:        [id number]                      Room:       [facility]  Gender:     Female                       Technician: [id number]  :        [id number]                   Requested By:ER TRIAGE PROTOCOL  Order #:    [id number]                    Reading MD:    Measurements  Intervals                                Axis  Rate:       88                           P:          -5  TX:         190                          QRS:        15  QRSD:       63                           T:          45  QT:         415  QTc:        503    Interpretive Statements  Sinus rhythm  Borderline T abnormalities, lateral leads  Prolonged QT interval  Compared to ECG 2017 07:40:50  T-wave abnormality now present  Prolonged QT interval now present       All labs reviewed by me.    EKG  12 Lead EKG obtained at 2200 and interpreted by me to show:  Rhythm: Normal sinus rhythm   Rate: 88  Axis: Normal  Intervals: Normal  Q Waves: Normal  No diagnostic ST segment elevation  QTC mildly prolonged, 503  Clinical Impression: Normal EKG  Compared to May 12, 2017, no ischemic change but QTC is mildly prolonged at 503    RADIOLOGY  EC-ECHOCARDIOGRAM COMPLETE W/O CONT   Final Result      DX-ANKLE 2- VIEWS LEFT   Final  Result         1.  Lateral and medial ankle malleolus fractures.   2.  Interval reduction of ankle dislocation.      DX-ANKLE 2- VIEWS LEFT   Final Result         1.  Lateral and medial malleolus fracture, similar to prior study.   2.  Posterior lateral subluxation/dislocation the ankle mortise joint, slightly reduced since prior study.      DX-ANKLE 2- VIEWS LEFT   Final Result         1.  Lateral and medial malleolus fractures with lateral subluxation/dislocation of the ankle mortise joint.      DX-CHEST-PORTABLE (1 VIEW)   Final Result         1.  No acute cardiopulmonary disease.        2316 on my view of the second postreduction film ankle appears near anatomic, there is satisfactory reduction indeed.      The radiologist's interpretation of all radiological studies have been reviewed by me.    COURSE & MEDICAL DECISION MAKING  Pertinent Labs & Imaging studies reviewed. (See chart for details)    8:00 PM - Patient seen and examined at bedside. Patient will be treated with fluid resuscitation, morphine, Zofran. Ordered cardiac work-up with cardiac monitoring as well as x-ray of the ankle to evaluate her symptoms. The differential diagnoses include but are not limited to: Orthostatic hypotension, cardiac dysrhythmia, electrolyte abnormality, fracture  2024: Patient reassessed, updated with concerning x-ray, have paged the orthopedist.    2035: Spoke with Dr. Oneal the orthopedic service, he concurs with plans for emergent reduction in the ED and will assess the patient for operative intervention either tomorrow or when she is medically cleared with regard to her syncopal work-up.    2101: Patient reassessed.  I discussed risks and benefits with regard to procedural sedation and fracture reduction including respiratory insufficiency and damage to surrounding structures.  Risks and benefits patient consents to the procedure.    Conscious Sedation Procedure Note    Indication: fracture dislocation    Consent: I  have discussed with the patient and/or the patient representative the indication, alternatives, and the possible risks and/or complications of the planned procedure and the anesthesia methods. The patient and/or patient representative appear to understand and agree to proceed.    Physician Involvement: The attending physician was present and supervising this procedure.    Pre-Sedation Documentation and Exam: I have personally completed a history, physical exam & review of systems for this patient (see notes).  Vital signs have been reviewed (see flow sheet for vitals).  I have reviewed the patient's history and review of systems.  Airway Assessment: normal neck range of motion, Mallampati Class II - (soft palate, fauces & uvula are visible)  f3  Prior History of Anesthesia Complications: none    ASA Classification: Class 2 - A normal healthy patient with mild systemic disease    Sedation/ Anesthesia Plan: intravenous sedation    Medications Used: propofol intravenously    Monitoring and Safety: The patient was placed on a cardiac monitor and vital signs, pulse oximetry and level of consciousness were continuously evaluated throughout the procedure. The patient was closely monitored until recovery from the medications was complete and the patient had returned to baseline status. Respiratory therapy was on standby at all times during the procedure.      (The following sections must be completed)  Post-Sedation Vital Signs: Vital signs were reviewed and were stable after the procedure (see flow sheet for vitals)            Intraservice Time: Greater than 10 minutes    Post-Sedation Exam: Cardiovascular: normal, regular rate and rhythm           Complications: none    I provided both the sedation and procedure, a nurse was present at the bedside for the entire procedure.       Fracture Reduction Procedure Note    Indication: fracture dislocation of L ankle    Consent: I have discussed with the patient and/or the patient  representative the indication, alternatives, and the possible risks and/or complications of the planned procedure. The patient and/or patient representative appear to understand and agree to proceed.    Physician Involvement: The attending physician was present and supervising this procedure.    Pre-Sedation Documentation and Exam: I have personally completed a history, physical exam & review of systems for this patient (see notes).  Vital signs have been reviewed (see flow sheet for vitals).  I have reviewed the patient's history and review of systems.    Sedation/ Anesthesia Plan: intravenous sedation with propofol      Procedure Summary: The patient was placed in supine position.  Using traction countertraction as well as direct manipulation of the fracture fragment of the distal tibia and fibula of the left ankle palpable reduction achieved, remains neurovascular intact, 2+ DP and PT pulses, brisk cap refill, skin tone to the foot is actually much improved.  Wrapped in gauze and Ortho-Glass splint, both short posterior and stirrup secured with Ace bandage.  Post reduction film ordered            Intraservice Time: Greater than 10 minutes    Post-Sedation Exam: Cardiovascular: normal, regular rate and rhythm           Complications: Non satisfactory reduction on postreduction film    2125: Patient tolerates reduction well, as ordered with reduction film.    2235: Though during procedure reduction was achieved initially I think likely the ankle slipped while the splint was hardening as the post reduction film is not remotely satisfactory.  I updated the patient with unfortunate concerning findings and need for repeat reduction.    Conscious Sedation Procedure Note (second)    Indication: fracture dislocation    Consent: I have discussed with the patient and/or the patient representative the indication, alternatives, and the possible risks and/or complications of the planned procedure and the anesthesia methods.  The patient and/or patient representative appear to understand and agree to proceed.    Physician Involvement: The attending physician was present and supervising this procedure.    Pre-Sedation Documentation and Exam: I have personally completed a history, physical exam & review of systems for this patient (see notes).  Vital signs have been reviewed (see flow sheet for vitals).  I have reviewed the patient's history and review of systems.  Airway Assessment: normal neck range of motion, Mallampati Class II - (soft palate, fauces & uvula are visible)  f3  Prior History of Anesthesia Complications: none    ASA Classification: Class 2 - A normal healthy patient with mild systemic disease    Sedation/ Anesthesia Plan: intravenous sedation    Medications Used: propofol intravenously    Monitoring and Safety: The patient was placed on a cardiac monitor and vital signs, pulse oximetry and level of consciousness were continuously evaluated throughout the procedure. The patient was closely monitored until recovery from the medications was complete and the patient had returned to baseline status. Respiratory therapy was on standby at all times during the procedure.      (The following sections must be completed)  Post-Sedation Vital Signs: Vital signs were reviewed and were stable after the procedure (see flow sheet for vitals)            Intraservice Time: Greater than 10 minutes    Post-Sedation Exam: Cardiovascular: normal, regular rate and rhythm           Complications: none        Fracture Reduction Procedure Note (second)    Indication: fracture dislocation of L ankle    Consent: I have discussed with the patient and/or the patient representative the indication, alternatives, and the possible risks and/or complications of the planned procedure. The patient and/or patient representative appear to understand and agree to proceed.    Physician Involvement: The attending physician was present and supervising this  "procedure.    Pre-Sedation Documentation and Exam: I have personally completed a history, physical exam & review of systems for this patient (see notes).  Vital signs have been reviewed (see flow sheet for vitals).  I have reviewed the patient's history and review of systems.    Sedation/ Anesthesia Plan: intravenous sedation with propofol      Procedure Summary: The patient was placed in supine position.  Using traction countertraction as well as direct manipulation of the fracture fragment of the distal tibia and fibula of the left ankle palpable reduction achieved, remains neurovascular intact, 2+ DP and PT pulses, brisk cap refill.  Wrapped in gauze and Ortho-Glass splint, both short posterior and stirrup secured with Ace bandage.  Manual fracture molding also done prior to hardening of the Ortho-Glass. Post reduction film ordered            Intraservice Time: Greater than 10 minutes    Post-Sedation Exam: Cardiovascular: normal, regular rate and rhythm           Complications: None    2255: Patient tolerates a second reduction well, postreduction film is much improved from previous attempt.    Patient Vitals for the past 24 hrs:   BP Temp Pulse Resp SpO2 Height Weight   08/08/21 2259 -- -- 84 12 95 % -- --   08/08/21 2247 140/76 -- 97 16 91 % -- --   08/08/21 2243 130/60 -- 61 16 99 % -- --   08/08/21 2229 130/60 -- 89 -- 98 % -- --   08/08/21 2214 123/60 -- 87 -- 91 % -- --   08/08/21 2159 124/56 -- 84 -- 95 % -- --   08/08/21 2144 134/64 -- 80 -- 93 % -- --   08/08/21 2130 128/58 -- 82 19 93 % -- --   08/08/21 2124 147/65 -- 90 16 94 % -- --   08/08/21 2119 147/65 -- 88 16 96 % -- --   08/08/21 2115 147/65 -- 80 20 97 % -- --   08/08/21 2114 138/71 -- 82 19 95 % -- --   08/08/21 2114 135/64 -- -- -- -- -- --   08/08/21 2104 -- -- -- -- -- 1.6 m (5' 3\") 72.6 kg (160 lb)   08/08/21 2104 137/62 -- 92 16 99 % -- --   08/08/21 2100 -- -- (!) 101 (!) 48 94 % -- --   08/08/21 2059 122/58 -- 97 (!) 37 90 % -- -- " "  08/08/21 2039 105/55 -- 98 (!) 53 94 % -- --   08/08/21 2019 106/54 -- 94 (!) 27 94 % -- --   08/08/21 1959 108/52 -- 98 (!) 94 94 % -- --   08/08/21 1939 100/48 -- 87 16 94 % -- --   08/08/21 1933 105/49 36.8 °C (98.3 °F) 84 16 95 % -- --   08/08/21 1932 -- -- -- -- -- 1.6 m (5' 3\") 72.6 kg (160 lb)     HYDRATION: Based on the patient's presentation of Hypotension the patient was given IV fluids. IV Hydration was used because oral hydration failed due to Patient initially n.p.o. pending her procedure. Upon recheck following hydration, the patient was Doing better, hypotension resolved, current /62.    Decision Making:  This is a 71 y.o. year old female who presents with syncopal fall.  Patient notes feeling lightheaded and racing heart rate and then woke up on the ground.  She has a significant ankle deformity and surprisingly was actually able to briefly ambulate on her ankle after the syncopal fall.  Patient is neurovascular intact but obvious lateral deformity of the ankle concerning for significant fracture.  Confirmed by x-ray imaging demonstrating a bimalleolar fracture.  A consult with orthopedics who concurs she will eventually require operative repair, requests patient be reduced here in the ED.  Initial reduction unfortunately likely slipped while the splint was drying.  Unfortunately patient need to be resedated, repeat reduction demonstrates near anatomic and very satisfactory reduction with repeat splinting.  Patient will be admitted to evaluate further with regard to her syncope, certainly given her initial hypotension patient could be possibly overmedicated with regard to her antihypertensive.  However her QTC is also mildly prolonged, further cardiac evaluation including monitoring and echocardiogram will be obtained.    Patient is admitted to the care of the hospitalist with orthopedic consultation to medical telemetry in improved but guarded condition    FINAL IMPRESSION  1. Displaced " bimalleolar fracture, left, closed, initial encounter    2. Syncope, unspecified syncope type    3. Hypotension, unspecified hypotension type    4. Closed fracture of left ankle with malunion     4.     Prolonged QT     Cesar PITT M.D. (Scribe), am scribing for, and in the presence of, Cesar Mtz MD.    Electronically signed by: Cesar Mtz M.D. (Scribe), 8/8/2021    ICesar MD personally performed the services described in this documentation, as scribed by Cesar Mtz M.D. in my presence, and it is both accurate and complete    The note accurately reflects work and decisions made by me.  Cesar Mtz M.D.  8/8/2021  11:19 PM

## 2021-08-09 NOTE — ASSESSMENT & PLAN NOTE
Noted to be hypotensive on EMS arrival  Did require IV fluids  Reports significant weight loss that was intentional  Possibly need adjustment to home antihypertensive medications  -Patient takes irbesartan 150 mg daily at home, will reduce to 75 mg and monitor

## 2021-08-09 NOTE — PROGRESS NOTES
Ortho static done, was able to transfer from Bed to chair to toilet. Kept NPO for now. Echo done at bedside.

## 2021-08-09 NOTE — DISCHARGE INSTRUCTIONS
Discharge Instructions    Discharged to home by car with relative. Discharged via wheelchair, hospital escort: Yes.  Special equipment needed: Wheelchair    Be sure to schedule a follow-up appointment with your primary care doctor or any specialists as instructed.     Discharge Plan:   Diet Plan: Discussed  Activity Level: Discussed  Confirmed Follow up Appointment: Patient to Call and Schedule Appointment  Confirmed Symptoms Management: Discussed  Medication Reconciliation Updated: Yes    I understand that a diet low in cholesterol, fat, and sodium is recommended for good health. Unless I have been given specific instructions below for another diet, I accept this instruction as my diet prescription.   Other diet: Regular Diet    Special Instructions: None    · Is patient discharged on Warfarin / Coumadin?   No     Depression / Suicide Risk    As you are discharged from this [facility] facility, it is important to learn how to keep safe from harming yourself.    Recognize the warning signs:  · Abrupt changes in personality, positive or negative- including increase in energy   · Giving away possessions  · Change in eating patterns- significant weight changes-  positive or negative  · Change in sleeping patterns- unable to sleep or sleeping all the time   · Unwillingness or inability to communicate  · Depression  · Unusual sadness, discouragement and loneliness  · Talk of wanting to die  · Neglect of personal appearance   · Rebelliousness- reckless behavior  · Withdrawal from people/activities they love  · Confusion- inability to concentrate     If you or a loved one observes any of these behaviors or has concerns about self-harm, here's what you can do:  · Talk about it- your feelings and reasons for harming yourself  · Remove any means that you might use to hurt yourself (examples: pills, rope, extension cords, firearm)  · Get professional help from the community (Mental Health, Substance Abuse, psychological  counseling)  · Do not be alone:Call your Safe Contact- someone whom you trust who will be there for you.  · Call your local CRISIS HOTLINE 396-4397 or 925-799-0070  · Call your local Children's Mobile Crisis Response Team [place] (174) 821-4000 or www.BeneStream  · Call the toll free National Suicide Prevention Hotlines   · National Suicide Prevention Lifeline 251-718-GFAJ (3941)  · National Hope Line Network 800-SUICIDE (874-0462)

## 2021-08-09 NOTE — PROGRESS NOTES
4 Eyes Skin Assessment Completed by Georgiana RN and ASHVIN Ko.    Head WDL  Ears WDL  Nose WDL  Mouth WDL  Neck WDL  Breast/Chest WDL  Shoulder Blades WDL  Spine WDL  (R) Arm/Elbow/Hand WDL  (L) Arm/Elbow/Hand WDL  Abdomen WDL  Groin WDL  Scrotum/Coccyx/Buttocks WDL  (R) Leg WDL  (L) Leg WDL  (R) Heel/Foot/Toe WDL  (L) Heel/Foot/Toe Fracture-reduction in ED          Devices In Places Ortho Boot/Shoe      Interventions In Place Gray Ear Foams    Possible Skin Injury No    Pictures Uploaded Into Epic N/A  Wound Consult Placed N/A  RN Wound Prevention Protocol Ordered No

## 2021-08-10 NOTE — H&P (VIEW-ONLY)
8/9/2021    Consult time: 2030  Evaluation time: 0650    Reason for consultation: Left ankle fracture dislocation    Consultation on Yuki Elena Cavgarimaia at the request of Dr. Mtz for a left ankle fracture dislocation.  The patient is a 71 y.o. female who presents with a left ankle fracture dislocation due to syncopal episode and ground level fall.  The patient noted immediate pain and inability to move the affected extremity due to pain as well as an obvious deformity.  They were evaluated in the ER, and Orthopedics was consulted. Patient denies numbness, paresthesias, or other symptoms.    Past Medical History:   Diagnosis Date   • Arthritis     hands   • Cancer (CMS-HCC)    • Heart burn    • Hemorrhagic disorder (CMS-[facility])     nose bleeds, reason for surgery   • Hypertension     working with primary doctor to find right medication, none currently       Past Surgical History:   Procedure Laterality Date   • SEPTOPLASTY N/A 6/2/2017    Procedure: SEPTOPLASTY;  Surgeon: Rodrick Coronado M.D.;  Location: SURGERY SAME DAY [facility] ORS;  Service:    • TURBINATE REDUCTION Bilateral 6/2/2017    Procedure: TURBINATE REDUCTION - RESECTION W/SUBMUCOSAL APPROACH;  Surgeon: Rodrick Coronado M.D.;  Location: SURGERY SAME DAY [facility] ORS;  Service:    • OTHER  2012    completed thyroidectomy   • GYN SURGERY  2003-04    3 or 4 D&Cs   • OTHER  1985    subtotal thyroidectomy   • GYN SURGERY  1975    tubal ligation       Medications  No current facility-administered medications on file prior to encounter.     Current Outpatient Medications on File Prior to Encounter   Medication Sig Dispense Refill   • acetaminophen (TYLENOL) 325 MG Tab Take 2 Tablets by mouth every 6 hours as needed. 30 tablet 0   • pravastatin (PRAVACHOL) 40 MG tablet Take 40 mg by mouth every evening.     • levothyroxine (SYNTHROID) 88 MCG Tab Take 88 mcg by mouth Every morning on an empty stomach.         Allergies  Tequin [gatifloxacin]    ROS  Per HPI.  "All other systems were reviewed and found to be negative    No family history on file.    Social History     Socioeconomic History   • Marital status: Single     Spouse name: Not on file   • Number of children: Not on file   • Years of education: Not on file   • Highest education level: Not on file   Occupational History   • Not on file   Tobacco Use   • Smoking status: Never Smoker   Substance and Sexual Activity   • Alcohol use: Yes     Comment: 4-5 per month   • Drug use: No   • Sexual activity: Not on file   Other Topics Concern   • Not on file   Social History Narrative   • Not on file     Social Determinants of Health     Financial Resource Strain:    • Difficulty of Paying Living Expenses:    Food Insecurity:    • Worried About Running Out of Food in the Last Year:    • Ran Out of Food in the Last Year:    Transportation Needs:    • Lack of Transportation (Medical):    • Lack of Transportation (Non-Medical):    Physical Activity:    • Days of Exercise per Week:    • Minutes of Exercise per Session:    Stress:    • Feeling of Stress :    Social Connections:    • Frequency of Communication with Friends and Family:    • Frequency of Social Gatherings with Friends and Family:    • Attends Gnosticist Services:    • Active Member of Clubs or Organizations:    • Attends Club or Organization Meetings:    • Marital Status:    Intimate Partner Violence:    • Fear of Current or Ex-Partner:    • Emotionally Abused:    • Physically Abused:    • Sexually Abused:        Physical Exam  Vitals  /60   Pulse (!) 56   Temp 37.2 °C (99 °F) (Temporal)   Resp 16   Ht 1.575 m (5' 2\")   Wt 68.5 kg (151 lb)   SpO2 95%   General: Well Developed, Well Nourished, no acute distress  Psychiatric: Alert and oriented x3, appropriate responses to questions, pleasant mood and affect.  HEENT: Normocephalic, atraumatic  Eyes: Anicteric, PERRL  Neck: Midline trachea, no pain with ROM  Chest: Symmetric expansion of the chest wall, " non-tender to palpation, no distress.  Heart: RRR, palpable peripheral pulses  Abdomen: Soft, NT, ND  Skin: Intact, no open wounds  Extremities: Splint in place left lower extremity  Neuro: Intact light touch sensation left foot, intact motors TA/GS/EHL/P  Vascular: 2+ DP on left, Capillary refill <2 seconds    Radiographs:  EC-ECHOCARDIOGRAM COMPLETE W/O CONT   Final Result      DX-ANKLE 2- VIEWS LEFT   Final Result         1.  Lateral and medial ankle malleolus fractures.   2.  Interval reduction of ankle dislocation.      DX-ANKLE 2- VIEWS LEFT   Final Result         1.  Lateral and medial malleolus fracture, similar to prior study.   2.  Posterior lateral subluxation/dislocation the ankle mortise joint, slightly reduced since prior study.      DX-ANKLE 2- VIEWS LEFT   Final Result         1.  Lateral and medial malleolus fractures with lateral subluxation/dislocation of the ankle mortise joint.      DX-CHEST-PORTABLE (1 VIEW)   Final Result         1.  No acute cardiopulmonary disease.          Laboratory Values  Recent Labs     08/08/21  1930 08/09/21  0447   WBC 9.0 8.1   RBC 4.15* 3.83*   HEMOGLOBIN 12.8 12.1   HEMATOCRIT 37.4 34.9*   MCV 90.1 91.1   MCH 30.8 31.6   MCHC 34.2 34.7   RDW 43.8 45.1   PLATELETCT 279 239   MPV 10.1 10.1     Recent Labs     08/08/21  1930 08/09/21  0447   SODIUM 134* 133*   POTASSIUM 3.6 4.2   CHLORIDE 99 102   CO2 22 24   GLUCOSE 158* 99   BUN 18 18             Impression:    #1 Left bimalleolar ankle fracture  #2 Syncopal episode    Plan:    She will require operative treatment of her bimalleolar ankle fracture.  She still has her syncope work-up pending this morning as well as a TTE as that she has not ready for surgery.  Her ankle fracture can be treated as an outpatient and surgery would not be scheduled today.  As such it would be fine for her to discharge home and return for surgery as an outpatient.  She should remain toe-touch weightbearing for now.  She should elevate  the extremity at all times while at rest to reduce swelling in preparation for surgery.  She is up with the plan.  We can schedule surgery as an outpatient later this week or early next week.

## 2021-08-10 NOTE — CONSULTS
8/9/2021    Consult time: 2030  Evaluation time: 0650    Reason for consultation: Left ankle fracture dislocation    Consultation on Yuki Elean Cavgarimaia at the request of Dr. Mtz for a left ankle fracture dislocation.  The patient is a 71 y.o. female who presents with a left ankle fracture dislocation due to syncopal episode and ground level fall.  The patient noted immediate pain and inability to move the affected extremity due to pain as well as an obvious deformity.  They were evaluated in the ER, and Orthopedics was consulted. Patient denies numbness, paresthesias, or other symptoms.    Past Medical History:   Diagnosis Date   • Arthritis     hands   • Cancer (CMS-HCC)    • Heart burn    • Hemorrhagic disorder (CMS-[facility])     nose bleeds, reason for surgery   • Hypertension     working with primary doctor to find right medication, none currently       Past Surgical History:   Procedure Laterality Date   • SEPTOPLASTY N/A 6/2/2017    Procedure: SEPTOPLASTY;  Surgeon: Rodrick Coronado M.D.;  Location: SURGERY SAME DAY [facility] ORS;  Service:    • TURBINATE REDUCTION Bilateral 6/2/2017    Procedure: TURBINATE REDUCTION - RESECTION W/SUBMUCOSAL APPROACH;  Surgeon: Rodrick Coronado M.D.;  Location: SURGERY SAME DAY [facility] ORS;  Service:    • OTHER  2012    completed thyroidectomy   • GYN SURGERY  2003-04    3 or 4 D&Cs   • OTHER  1985    subtotal thyroidectomy   • GYN SURGERY  1975    tubal ligation       Medications  No current facility-administered medications on file prior to encounter.     Current Outpatient Medications on File Prior to Encounter   Medication Sig Dispense Refill   • acetaminophen (TYLENOL) 325 MG Tab Take 2 Tablets by mouth every 6 hours as needed. 30 tablet 0   • pravastatin (PRAVACHOL) 40 MG tablet Take 40 mg by mouth every evening.     • levothyroxine (SYNTHROID) 88 MCG Tab Take 88 mcg by mouth Every morning on an empty stomach.         Allergies  Tequin [gatifloxacin]    ROS  Per HPI.  "All other systems were reviewed and found to be negative    No family history on file.    Social History     Socioeconomic History   • Marital status: Single     Spouse name: Not on file   • Number of children: Not on file   • Years of education: Not on file   • Highest education level: Not on file   Occupational History   • Not on file   Tobacco Use   • Smoking status: Never Smoker   Substance and Sexual Activity   • Alcohol use: Yes     Comment: 4-5 per month   • Drug use: No   • Sexual activity: Not on file   Other Topics Concern   • Not on file   Social History Narrative   • Not on file     Social Determinants of Health     Financial Resource Strain:    • Difficulty of Paying Living Expenses:    Food Insecurity:    • Worried About Running Out of Food in the Last Year:    • Ran Out of Food in the Last Year:    Transportation Needs:    • Lack of Transportation (Medical):    • Lack of Transportation (Non-Medical):    Physical Activity:    • Days of Exercise per Week:    • Minutes of Exercise per Session:    Stress:    • Feeling of Stress :    Social Connections:    • Frequency of Communication with Friends and Family:    • Frequency of Social Gatherings with Friends and Family:    • Attends Mandaeism Services:    • Active Member of Clubs or Organizations:    • Attends Club or Organization Meetings:    • Marital Status:    Intimate Partner Violence:    • Fear of Current or Ex-Partner:    • Emotionally Abused:    • Physically Abused:    • Sexually Abused:        Physical Exam  Vitals  /60   Pulse (!) 56   Temp 37.2 °C (99 °F) (Temporal)   Resp 16   Ht 1.575 m (5' 2\")   Wt 68.5 kg (151 lb)   SpO2 95%   General: Well Developed, Well Nourished, no acute distress  Psychiatric: Alert and oriented x3, appropriate responses to questions, pleasant mood and affect.  HEENT: Normocephalic, atraumatic  Eyes: Anicteric, PERRL  Neck: Midline trachea, no pain with ROM  Chest: Symmetric expansion of the chest wall, " non-tender to palpation, no distress.  Heart: RRR, palpable peripheral pulses  Abdomen: Soft, NT, ND  Skin: Intact, no open wounds  Extremities: Splint in place left lower extremity  Neuro: Intact light touch sensation left foot, intact motors TA/GS/EHL/P  Vascular: 2+ DP on left, Capillary refill <2 seconds    Radiographs:  EC-ECHOCARDIOGRAM COMPLETE W/O CONT   Final Result      DX-ANKLE 2- VIEWS LEFT   Final Result         1.  Lateral and medial ankle malleolus fractures.   2.  Interval reduction of ankle dislocation.      DX-ANKLE 2- VIEWS LEFT   Final Result         1.  Lateral and medial malleolus fracture, similar to prior study.   2.  Posterior lateral subluxation/dislocation the ankle mortise joint, slightly reduced since prior study.      DX-ANKLE 2- VIEWS LEFT   Final Result         1.  Lateral and medial malleolus fractures with lateral subluxation/dislocation of the ankle mortise joint.      DX-CHEST-PORTABLE (1 VIEW)   Final Result         1.  No acute cardiopulmonary disease.          Laboratory Values  Recent Labs     08/08/21  1930 08/09/21  0447   WBC 9.0 8.1   RBC 4.15* 3.83*   HEMOGLOBIN 12.8 12.1   HEMATOCRIT 37.4 34.9*   MCV 90.1 91.1   MCH 30.8 31.6   MCHC 34.2 34.7   RDW 43.8 45.1   PLATELETCT 279 239   MPV 10.1 10.1     Recent Labs     08/08/21  1930 08/09/21  0447   SODIUM 134* 133*   POTASSIUM 3.6 4.2   CHLORIDE 99 102   CO2 22 24   GLUCOSE 158* 99   BUN 18 18             Impression:    #1 Left bimalleolar ankle fracture  #2 Syncopal episode    Plan:    She will require operative treatment of her bimalleolar ankle fracture.  She still has her syncope work-up pending this morning as well as a TTE as that she has not ready for surgery.  Her ankle fracture can be treated as an outpatient and surgery would not be scheduled today.  As such it would be fine for her to discharge home and return for surgery as an outpatient.  She should remain toe-touch weightbearing for now.  She should elevate  the extremity at all times while at rest to reduce swelling in preparation for surgery.  She is up with the plan.  We can schedule surgery as an outpatient later this week or early next week.

## 2021-08-13 PROBLEM — S82.842A: Status: ACTIVE | Noted: 2021-08-13

## 2021-08-16 ENCOUNTER — PRE-ADMISSION TESTING (OUTPATIENT)
Dept: ADMISSIONS | Facility: MEDICAL CENTER | Age: 71
End: 2021-08-16
Attending: ORTHOPAEDIC SURGERY
Payer: MEDICARE

## 2021-08-16 RX ORDER — IBUPROFEN 200 MG
200 TABLET ORAL EVERY 6 HOURS PRN
COMMUNITY

## 2021-08-16 NOTE — PREPROCEDURE INSTRUCTIONS
"Preadmit Phone appointment: \" Preparing for your Procedure information\" Instructions discussed with Patient.   Patient instructed to continue prescribed medications through the day before surgery, instructed to take the following medications the day of surgery per anesthesia protocol: Tylenol PRN, Synthroid, Oxycodone PRN.     Pt states, \"no issues with anesthesia\".  Fasting guidelines discussed with Patient,  NPO from solid food at midnight prior to surgery.  Pt encouraged to hydrate the day before surgery.with NPO from solid food at midnight prior to surgery and 3 hour cutoff for clear liquids.   Clear liquids defined.  All Pt's questions and concerns answered or addressed.  Emailed all instructions.  COVID Vaccination x 2  "

## 2021-08-17 ENCOUNTER — HOSPITAL ENCOUNTER (OUTPATIENT)
Facility: MEDICAL CENTER | Age: 71
End: 2021-08-17
Attending: ORTHOPAEDIC SURGERY | Admitting: ORTHOPAEDIC SURGERY
Payer: MEDICARE

## 2021-08-17 ENCOUNTER — APPOINTMENT (OUTPATIENT)
Dept: RADIOLOGY | Facility: MEDICAL CENTER | Age: 71
End: 2021-08-17
Attending: ORTHOPAEDIC SURGERY
Payer: MEDICARE

## 2021-08-17 ENCOUNTER — ANESTHESIA EVENT (OUTPATIENT)
Dept: SURGERY | Facility: MEDICAL CENTER | Age: 71
End: 2021-08-17
Payer: MEDICARE

## 2021-08-17 ENCOUNTER — ANESTHESIA (OUTPATIENT)
Dept: SURGERY | Facility: MEDICAL CENTER | Age: 71
End: 2021-08-17
Payer: MEDICARE

## 2021-08-17 VITALS
TEMPERATURE: 97.5 F | WEIGHT: 149.69 LBS | RESPIRATION RATE: 16 BRPM | SYSTOLIC BLOOD PRESSURE: 122 MMHG | OXYGEN SATURATION: 95 % | DIASTOLIC BLOOD PRESSURE: 66 MMHG | BODY MASS INDEX: 27.38 KG/M2 | HEART RATE: 60 BPM

## 2021-08-17 PROCEDURE — 160048 HCHG OR STATISTICAL LEVEL 1-5: Performed by: ORTHOPAEDIC SURGERY

## 2021-08-17 PROCEDURE — 700101 HCHG RX REV CODE 250: Performed by: ORTHOPAEDIC SURGERY

## 2021-08-17 PROCEDURE — 64445 NJX AA&/STRD SCIATIC NRV IMG: CPT | Performed by: ORTHOPAEDIC SURGERY

## 2021-08-17 PROCEDURE — 160002 HCHG RECOVERY MINUTES (STAT): Performed by: ORTHOPAEDIC SURGERY

## 2021-08-17 PROCEDURE — 700105 HCHG RX REV CODE 258: Performed by: ORTHOPAEDIC SURGERY

## 2021-08-17 PROCEDURE — 500881 HCHG PACK, EXTREMITY: Performed by: ORTHOPAEDIC SURGERY

## 2021-08-17 PROCEDURE — 700111 HCHG RX REV CODE 636 W/ 250 OVERRIDE (IP): Performed by: ANESTHESIOLOGY

## 2021-08-17 PROCEDURE — 73610 X-RAY EXAM OF ANKLE: CPT | Mod: LT

## 2021-08-17 PROCEDURE — 160025 RECOVERY II MINUTES (STATS): Performed by: ORTHOPAEDIC SURGERY

## 2021-08-17 PROCEDURE — 160046 HCHG PACU - 1ST 60 MINS PHASE II: Performed by: ORTHOPAEDIC SURGERY

## 2021-08-17 PROCEDURE — 77071 MNL APPL STRS JT RADIOGRAPHY: CPT | Performed by: ORTHOPAEDIC SURGERY

## 2021-08-17 PROCEDURE — 700101 HCHG RX REV CODE 250: Performed by: ANESTHESIOLOGY

## 2021-08-17 PROCEDURE — 160041 HCHG SURGERY MINUTES - EA ADDL 1 MIN LEVEL 4: Performed by: ORTHOPAEDIC SURGERY

## 2021-08-17 PROCEDURE — 503036 HCHG GUIDE PIN,OIC: Performed by: ORTHOPAEDIC SURGERY

## 2021-08-17 PROCEDURE — 501838 HCHG SUTURE GENERAL: Performed by: ORTHOPAEDIC SURGERY

## 2021-08-17 PROCEDURE — 27814 TREATMENT OF ANKLE FRACTURE: CPT | Mod: LT | Performed by: ORTHOPAEDIC SURGERY

## 2021-08-17 PROCEDURE — 160035 HCHG PACU - 1ST 60 MINS PHASE I: Performed by: ORTHOPAEDIC SURGERY

## 2021-08-17 PROCEDURE — 160029 HCHG SURGERY MINUTES - 1ST 30 MINS LEVEL 4: Performed by: ORTHOPAEDIC SURGERY

## 2021-08-17 PROCEDURE — C1713 ANCHOR/SCREW BN/BN,TIS/BN: HCPCS | Performed by: ORTHOPAEDIC SURGERY

## 2021-08-17 PROCEDURE — 160009 HCHG ANES TIME/MIN: Performed by: ORTHOPAEDIC SURGERY

## 2021-08-17 DEVICE — SCREW 3.5 MM LOCKING TI X 10MM LONG (6TX8+2TX5=58): Type: IMPLANTABLE DEVICE | Site: ANKLE | Status: FUNCTIONAL

## 2021-08-17 DEVICE — SCREW CANN 4.0X46 LONG OIC - (3TX2=6): Type: IMPLANTABLE DEVICE | Site: ANKLE | Status: FUNCTIONAL

## 2021-08-17 DEVICE — SCREW 2.5 MM LOCKING TI X 14MM LONG (6TX8=48): Type: IMPLANTABLE DEVICE | Site: ANKLE | Status: FUNCTIONAL

## 2021-08-17 DEVICE — SCREW 3.5 MM NON-LOCKING TI X 18MM LONG (6TX8+2TX5=58): Type: IMPLANTABLE DEVICE | Site: ANKLE | Status: FUNCTIONAL

## 2021-08-17 DEVICE — PLATE DISTAL FIBULA 5H (2TX2+2TX1=6): Type: IMPLANTABLE DEVICE | Site: ANKLE | Status: FUNCTIONAL

## 2021-08-17 DEVICE — SCREW 2.5 MM LOCKING TI X 16MM LONG (6TX8=48): Type: IMPLANTABLE DEVICE | Site: ANKLE | Status: FUNCTIONAL

## 2021-08-17 DEVICE — SCREW 2.5 MM LOCKING TI X 10MM LONG (6TX8=48): Type: IMPLANTABLE DEVICE | Site: ANKLE | Status: FUNCTIONAL

## 2021-08-17 DEVICE — SCREW 3.5 MM NON-LOCKING TI X 10MM LONG (6TX8+2TX5=58): Type: IMPLANTABLE DEVICE | Site: ANKLE | Status: FUNCTIONAL

## 2021-08-17 RX ORDER — SODIUM CHLORIDE, SODIUM LACTATE, POTASSIUM CHLORIDE, CALCIUM CHLORIDE 600; 310; 30; 20 MG/100ML; MG/100ML; MG/100ML; MG/100ML
INJECTION, SOLUTION INTRAVENOUS CONTINUOUS
Status: DISCONTINUED | OUTPATIENT
Start: 2021-08-17 | End: 2021-08-17 | Stop reason: HOSPADM

## 2021-08-17 RX ORDER — MIDAZOLAM HYDROCHLORIDE 1 MG/ML
1 INJECTION INTRAMUSCULAR; INTRAVENOUS
Status: DISCONTINUED | OUTPATIENT
Start: 2021-08-17 | End: 2021-08-17 | Stop reason: HOSPADM

## 2021-08-17 RX ORDER — ONDANSETRON 2 MG/ML
4 INJECTION INTRAMUSCULAR; INTRAVENOUS
Status: DISCONTINUED | OUTPATIENT
Start: 2021-08-17 | End: 2021-08-17 | Stop reason: HOSPADM

## 2021-08-17 RX ORDER — HYDROMORPHONE HYDROCHLORIDE 1 MG/ML
0.1 INJECTION, SOLUTION INTRAMUSCULAR; INTRAVENOUS; SUBCUTANEOUS
Status: DISCONTINUED | OUTPATIENT
Start: 2021-08-17 | End: 2021-08-17 | Stop reason: HOSPADM

## 2021-08-17 RX ORDER — HYDROMORPHONE HYDROCHLORIDE 1 MG/ML
0.4 INJECTION, SOLUTION INTRAMUSCULAR; INTRAVENOUS; SUBCUTANEOUS
Status: DISCONTINUED | OUTPATIENT
Start: 2021-08-17 | End: 2021-08-17 | Stop reason: HOSPADM

## 2021-08-17 RX ORDER — DIPHENHYDRAMINE HYDROCHLORIDE 50 MG/ML
12.5 INJECTION INTRAMUSCULAR; INTRAVENOUS
Status: DISCONTINUED | OUTPATIENT
Start: 2021-08-17 | End: 2021-08-17 | Stop reason: HOSPADM

## 2021-08-17 RX ORDER — OXYCODONE HCL 5 MG/5 ML
5 SOLUTION, ORAL ORAL
Status: DISCONTINUED | OUTPATIENT
Start: 2021-08-17 | End: 2021-08-17 | Stop reason: HOSPADM

## 2021-08-17 RX ORDER — HYDROMORPHONE HYDROCHLORIDE 1 MG/ML
0.2 INJECTION, SOLUTION INTRAMUSCULAR; INTRAVENOUS; SUBCUTANEOUS
Status: DISCONTINUED | OUTPATIENT
Start: 2021-08-17 | End: 2021-08-17 | Stop reason: HOSPADM

## 2021-08-17 RX ORDER — LIDOCAINE HYDROCHLORIDE 10 MG/ML
INJECTION, SOLUTION INFILTRATION; PERINEURAL
Status: DISCONTINUED
Start: 2021-08-17 | End: 2021-08-17 | Stop reason: HOSPADM

## 2021-08-17 RX ORDER — HALOPERIDOL 5 MG/ML
1 INJECTION INTRAMUSCULAR
Status: DISCONTINUED | OUTPATIENT
Start: 2021-08-17 | End: 2021-08-17 | Stop reason: HOSPADM

## 2021-08-17 RX ORDER — BUPIVACAINE HYDROCHLORIDE AND EPINEPHRINE 5; 5 MG/ML; UG/ML
INJECTION, SOLUTION PERINEURAL
Status: DISCONTINUED | OUTPATIENT
Start: 2021-08-17 | End: 2021-08-17 | Stop reason: HOSPADM

## 2021-08-17 RX ORDER — METOPROLOL TARTRATE 1 MG/ML
1 INJECTION, SOLUTION INTRAVENOUS
Status: DISCONTINUED | OUTPATIENT
Start: 2021-08-17 | End: 2021-08-17 | Stop reason: HOSPADM

## 2021-08-17 RX ORDER — LIDOCAINE HYDROCHLORIDE 20 MG/ML
INJECTION, SOLUTION EPIDURAL; INFILTRATION; INTRACAUDAL; PERINEURAL PRN
Status: DISCONTINUED | OUTPATIENT
Start: 2021-08-17 | End: 2021-08-17 | Stop reason: SURG

## 2021-08-17 RX ORDER — ONDANSETRON 2 MG/ML
INJECTION INTRAMUSCULAR; INTRAVENOUS PRN
Status: DISCONTINUED | OUTPATIENT
Start: 2021-08-17 | End: 2021-08-17 | Stop reason: SURG

## 2021-08-17 RX ORDER — LABETALOL HYDROCHLORIDE 5 MG/ML
5 INJECTION, SOLUTION INTRAVENOUS
Status: DISCONTINUED | OUTPATIENT
Start: 2021-08-17 | End: 2021-08-17 | Stop reason: HOSPADM

## 2021-08-17 RX ORDER — HYDRALAZINE HYDROCHLORIDE 20 MG/ML
5 INJECTION INTRAMUSCULAR; INTRAVENOUS
Status: DISCONTINUED | OUTPATIENT
Start: 2021-08-17 | End: 2021-08-17 | Stop reason: HOSPADM

## 2021-08-17 RX ORDER — MIDAZOLAM HYDROCHLORIDE 1 MG/ML
INJECTION INTRAMUSCULAR; INTRAVENOUS PRN
Status: DISCONTINUED | OUTPATIENT
Start: 2021-08-17 | End: 2021-08-17 | Stop reason: SURG

## 2021-08-17 RX ORDER — MEPERIDINE HYDROCHLORIDE 25 MG/ML
12.5 INJECTION INTRAMUSCULAR; INTRAVENOUS; SUBCUTANEOUS
Status: DISCONTINUED | OUTPATIENT
Start: 2021-08-17 | End: 2021-08-17 | Stop reason: HOSPADM

## 2021-08-17 RX ORDER — IPRATROPIUM BROMIDE AND ALBUTEROL SULFATE 2.5; .5 MG/3ML; MG/3ML
3 SOLUTION RESPIRATORY (INHALATION)
Status: DISCONTINUED | OUTPATIENT
Start: 2021-08-17 | End: 2021-08-17 | Stop reason: HOSPADM

## 2021-08-17 RX ORDER — BUPIVACAINE HYDROCHLORIDE AND EPINEPHRINE 5; 5 MG/ML; UG/ML
INJECTION, SOLUTION EPIDURAL; INTRACAUDAL; PERINEURAL
Status: COMPLETED | OUTPATIENT
Start: 2021-08-17 | End: 2021-08-17

## 2021-08-17 RX ORDER — DEXAMETHASONE SODIUM PHOSPHATE 4 MG/ML
INJECTION, SOLUTION INTRA-ARTICULAR; INTRALESIONAL; INTRAMUSCULAR; INTRAVENOUS; SOFT TISSUE
Status: COMPLETED | OUTPATIENT
Start: 2021-08-17 | End: 2021-08-17

## 2021-08-17 RX ORDER — OXYCODONE HCL 5 MG/5 ML
10 SOLUTION, ORAL ORAL
Status: DISCONTINUED | OUTPATIENT
Start: 2021-08-17 | End: 2021-08-17 | Stop reason: HOSPADM

## 2021-08-17 RX ORDER — CEFAZOLIN SODIUM 1 G/3ML
INJECTION, POWDER, FOR SOLUTION INTRAMUSCULAR; INTRAVENOUS PRN
Status: DISCONTINUED | OUTPATIENT
Start: 2021-08-17 | End: 2021-08-17 | Stop reason: SURG

## 2021-08-17 RX ADMIN — CEFAZOLIN 2 G: 330 INJECTION, POWDER, FOR SOLUTION INTRAMUSCULAR; INTRAVENOUS at 08:58

## 2021-08-17 RX ADMIN — PROPOFOL 130 MG: 10 INJECTION, EMULSION INTRAVENOUS at 08:50

## 2021-08-17 RX ADMIN — SODIUM CHLORIDE, POTASSIUM CHLORIDE, SODIUM LACTATE AND CALCIUM CHLORIDE: 600; 310; 30; 20 INJECTION, SOLUTION INTRAVENOUS at 09:58

## 2021-08-17 RX ADMIN — SODIUM CHLORIDE, POTASSIUM CHLORIDE, SODIUM LACTATE AND CALCIUM CHLORIDE: 600; 310; 30; 20 INJECTION, SOLUTION INTRAVENOUS at 08:15

## 2021-08-17 RX ADMIN — FENTANYL CITRATE 100 MCG: 50 INJECTION, SOLUTION INTRAMUSCULAR; INTRAVENOUS at 08:15

## 2021-08-17 RX ADMIN — DEXAMETHASONE SODIUM PHOSPHATE 4 MG: 4 INJECTION, SOLUTION INTRAMUSCULAR; INTRAVENOUS at 08:15

## 2021-08-17 RX ADMIN — ONDANSETRON 4 MG: 2 INJECTION INTRAMUSCULAR; INTRAVENOUS at 09:25

## 2021-08-17 RX ADMIN — FENTANYL CITRATE 100 MCG: 50 INJECTION, SOLUTION INTRAMUSCULAR; INTRAVENOUS at 09:12

## 2021-08-17 RX ADMIN — LIDOCAINE HYDROCHLORIDE 50 MG: 20 INJECTION, SOLUTION EPIDURAL; INFILTRATION; INTRACAUDAL; PERINEURAL at 08:50

## 2021-08-17 RX ADMIN — MIDAZOLAM HYDROCHLORIDE 2 MG: 1 INJECTION, SOLUTION INTRAMUSCULAR; INTRAVENOUS at 08:15

## 2021-08-17 RX ADMIN — BUPIVACAINE HYDROCHLORIDE AND EPINEPHRINE BITARTRATE 20 ML: 5; .0091 INJECTION, SOLUTION EPIDURAL; INTRACAUDAL; PERINEURAL at 08:15

## 2021-08-17 ASSESSMENT — FIBROSIS 4 INDEX: FIB4 SCORE: 1.37

## 2021-08-17 NOTE — OP REPORT
Orthopedic Operative Note    PREOPERATIVE DIAGNOSIS:    1. Left bimalleolar ankle fracture    POSTOPERATIVE DIAGNOSIS:    1. Same    PROCEDURE:    1. Open reduction internal fixation of left displaced bimalleolar ankle fracture  2. Physician applied stress radiograph left ankle    SURGEON:  Cesario Oneal MD    ASSISTANT: [place]    ANESTHESIOLOGIST: Dr. Anderson    ANESTHESIA TYPE: Regional and general    COMPLICATIONS: None    SPECIMENS: None    ESTIMATED BLOOD LOSS: 50 mL    DRAINS: None    IMPLANTS: OIC lateral distal fibular locking plate and 4.0 cannulated screws    INDICATIONS:    The patient is a pleasant 71-year-old female sustained a syncopal episode subsequent fall.  She sustained a left bimalleolar ankle fracture.  She underwent closed reduction and splinting.  She has a normal neurovascular and skin envelope.  Radiographs demonstrate displaced bimalleolar ankle fracture.  Given these findings she is an appropriate indicated candidate for open reduction internal fixation of her fracture.  I discussed the risk benefits and alternatives of the procedure with the patient including the risk of infection, wound healing complication, neurovascular, blood loss, DVT PE, malunion, nonunion, stiffness, symptomatic hardware, and the medical risks of anesthesia.  I discussed benefits including improved chance of union in acceptable alignment.  We discussed alternatives including nonoperative management.  Her informed consent was signed and documented.  I met with her preoperatively to astrid the operative extremity.    PROCEDURE IN DETAIL:    She underwent regional anesthesia at my request for postoperative pain control by Dr. Anderson.  She then underwent general anesthesia and was positioned supine.  All bony prominences were well-padded.  The left lower extremity was prepped and draped in sterile orthopedic fashion using ChloraPrep.  The surgical team scrubbed in.  A procedural pause was conducted to verify correct  patient, extremity, presence of the surgeon's initials on the operative extremity, and administration of IV antibiotics in this case, Ancef.    Following generalized agreement a medial curvilinear incision was made over the medial malleolus.  We dissected down to the fracture preserving the saphenous vein.  The fracture was identified minimally debrided and reduced with a pointed reduction forcep.  We secured this with 2 wires for the OI 4.0 mm cannulated screw system.  We confirm reduction on AP and lateral fluoroscopy are quite satisfied.  We then drilled for and placed to long threaded 4.0 mm cannulated screws over the wires.  The wires were then removed.  We confirmed medial reduction on AP and lateral fluoroscopy.  We then made a lateral incision over the fibula.  We dissected down to the fracture site and reduce this with a pointed reduction forcep.  We attempted to secure the fracture with a 3.5 mm lag screw from posterior to anterior however purchase was not ideal and the fracture still gapped.  As such we secured the reduction again with a pointed reduction forcep and supplemented the lag screw with two 1.6 mm K wires.  We then selected an [facility] lateral distal fibula locking plate and positioned this within the wound.  This secured proximally with a combination of 3.5 mm nonlocking and locking screws and distally with 2.5 mm locking screws.  All instruments were removed.  We confirm reduction and hardware placement AP and lateral fluoroscopy quite satisfied.  A physician applied external rotation stress test was performed under x-ray which demonstrated no gapping of the medial clear space.  As such no syndesmotic fixation was placed.  We then irrigated the wounds and closed in layers with 2-0 Vicryl and 3-0 nylon suture.  The medial wound was anesthetized with half percent Marcaine.  Sterile dressings were applied.  A posterior splint and stirrups were applied.  The patient was transported to the recovery  room in stable condition, sustaining no complications.    POST-OP PLAN:    1. Weightbearing status: Toe-touch weightbearing operative extremity.  Elevate to reduce swelling.  Keep splint clean dry and intact.  2. Antibiotics: None  3. DVT Prophylaxis:  SCDs in the facility, none upon discharge.  4. Discharge home

## 2021-08-17 NOTE — OR NURSING
1012 To PACU from OR via rbrie s/p left ankle ORIF. Pt awake, respirations spontaneous and non-labored. Surgical dressings to left ankle. Toes to the affected extremity are pink and warm, brisk cap refill observed. Pt has no complaints.     1020 No changes.    1030 Message left for pt's sister. Pt's sister updated via .    1035 No changes.     1050 No changes. Report to discharge ASHVIN Holder.

## 2021-08-17 NOTE — OR NURSING
1053: To stage ll. Up to chair and dressed w/ CNA assist. Pt is unable to sense touch to toes and is unable to move them. Denies pain.  1110: Home care instructions reviewed w/ pt and sister. No questions, meets criteria for discharge.

## 2021-08-17 NOTE — ANESTHESIA POSTPROCEDURE EVALUATION
Patient: Yuki Caraballo    Procedure Summary     Date: 08/17/21 Room / Location: [place] [address] / SURGERY [facility]    Anesthesia Start: 0847 Anesthesia Stop: 1015    Procedure: ORIF, ANKLE (Left Ankle) Diagnosis:       Closed displaced bimalleolar fracture of left ankle, initial encounter      ( Left bimalleolar ankle fracture)    Surgeons: Cesario Oneal M.D. Responsible Provider: Broderick Anderson M.D.    Anesthesia Type: general, peripheral nerve block ASA Status: 2          Final Anesthesia Type: general, peripheral nerve block  Last vitals  BP   Blood Pressure : 131/64    Temp   36.4 °C (97.5 °F)    Pulse   (!) 55   Resp   16    SpO2   99 %      Anesthesia Post Evaluation    Patient location during evaluation: PACU  Patient participation: complete - patient participated  Level of consciousness: awake and alert    Airway patency: patent  Anesthetic complications: no  Cardiovascular status: hemodynamically stable  Respiratory status: acceptable  Hydration status: euvolemic    PONV: none          No complications documented.     Nurse Pain Score: 0 (NPRS)

## 2021-08-17 NOTE — ANESTHESIA TIME REPORT
Anesthesia Start and Stop Event Times     Date Time Event    8/17/2021 0731 Ready for Procedure     0847 Anesthesia Start     1015 Anesthesia Stop        Responsible Staff  08/17/21    Name Role Begin End    Broderick Anderson M.D. Anesth 0847 1015        Preop Diagnosis (Free Text):  Pre-op Diagnosis     Closed displaced bimalleolar fracture of left ankle, initial encounter [S82.842A]        Preop Diagnosis (Codes):  Diagnosis Information     Diagnosis Code(s): Closed displaced bimalleolar fracture of left ankle, initial encounter [S82.842A]        Post op Diagnosis  Closed displaced bimalleolar fracture of left ankle, initial encounter      Premium Reason  Non-Premium    Comments:

## 2021-08-17 NOTE — ANESTHESIA PROCEDURE NOTES
Peripheral Block    Date/Time: 8/17/2021 8:15 AM  Performed by: Broderick Anderson M.D.  Authorized by: Broderick Anderson M.D.     Start Time:  8/17/2021 8:15 AM  End Time:  8/17/2021 8:20 AM  Reason for Block: at surgeon's request and post-op pain management ONLY    patient identified, IV checked, site marked, risks and benefits discussed, surgical consent, monitors and equipment checked, pre-op evaluation and timeout performed    Patient Position:  Right lateral decubitus  Prep: ChloraPrep    Monitoring:  Heart rate, continuous pulse ox and cardiac monitor  Block Region:  Lower Extremity  Lower Extremity - Block Type:  SCIATIC nerve block, lateral approach    Laterality:  Left  Procedures: ultrasound guided  Image captured, interpreted and electronically stored.  Local Infiltration:  Lidocaine  Strength:  1 %  Dose:  5 ml  Block Type:  Single-shot  Needle Length:  100mm  Needle Gauge:  21 G  Needle Localization:  Ultrasound guidance  Injection Assessment:  Negative aspiration for heme, no paresthesia on injection, incremental injection and local visualized surrounding nerve on ultrasound  Evidence of intravascular injection: No

## 2021-08-17 NOTE — ANESTHESIA PREPROCEDURE EVALUATION
Relevant Problems   CARDIAC   (positive) Essential hypertension      ENDO   (positive) Acquired hypothyroidism       Physical Exam    Airway   Mallampati: II  TM distance: >3 FB  Neck ROM: full       Cardiovascular - normal exam  Rhythm: regular  Rate: normal  (-) murmur     Dental - normal exam           Pulmonary - normal exam  Breath sounds clear to auscultation     Abdominal    Neurological - normal exam                 Anesthesia Plan    ASA 2       Plan - general and peripheral nerve block     Peripheral nerve block will be post-op pain control  Airway plan will be LMA          Induction: intravenous    Postoperative Plan: Postoperative administration of opioids is intended.    Pertinent diagnostic labs and testing reviewed    Informed Consent:    Anesthetic plan and risks discussed with patient.    Use of blood products discussed with: patient whom consented to blood products.

## 2021-08-17 NOTE — DISCHARGE INSTRUCTIONS
ACTIVITY: Rest and take it easy for the first 24 hours.  A responsible adult is recommended to remain with you during that time.  It is normal to feel sleepy.  We encourage you to not do anything that requires balance, judgment or coordination.    MILD FLU-LIKE SYMPTOMS ARE NORMAL. YOU MAY EXPERIENCE GENERALIZED MUSCLE ACHES, THROAT IRRITATION, HEADACHE AND/OR SOME NAUSEA.    FOR 24 HOURS DO NOT:  Drive, operate machinery or run household appliances.  Drink beer or alcoholic beverages.   Make important decisions or sign legal documents.    SPECIAL INSTRUCTIONS: Non-weight bearing for 24 hours (while nerve block is in effect) then toe-touch weightbearing. Elevate to reduce swelling.    DIET: To avoid nausea, slowly advance diet as tolerated, avoiding spicy or greasy foods for the first day. Add more substantial food to your diet according to your physician's instructions. INCREASE FLUIDS AND FIBER TO AVOID CONSTIPATION.    SURGICAL DRESSING/BATHING: Keep dressing clean, dry and intact until instructed otherwise by surgeon.    FOLLOW-UP APPOINTMENT:  A follow-up appointment should be arranged with your doctor; call to schedule.    You should CALL YOUR PHYSICIAN if you develop:  Fever greater than 101 degrees F.  Pain not relieved by medication, or persistent nausea or vomiting.  Excessive bleeding (blood soaking through dressing) or unexpected drainage from the wound.  Extreme redness or swelling around the incision site, drainage of pus or foul smelling drainage.  Inability to urinate or empty your bladder within 8 hours.  Problems with breathing or chest pain.    You should call 911 if you develop problems with breathing or chest pain.  If you are unable to contact your doctor or surgical center, you should go to the nearest emergency room or urgent care center.      Dr Oneal #: [phone]    If any questions arise, call your doctor.  If your doctor is not available, please feel free to call the Surgical Center  at [phone]. The Center is open Monday through Friday from 7AM to 7PM. You can also call the HEALTH HOTLINE open 24 hours/day, 7 days/week and speak to a nurse at [phone], or toll free at [phone].    A registered nurse may call you a few days after your surgery to see how you are doing after your procedure.    MEDICATIONS: Resume taking daily medication.  Take prescribed pain medication with food.  If no medication is prescribed, you may take non-aspirin pain medication if needed. PAIN MEDICATION CAN BE VERY CONSTIPATING. Take a stool softener or laxative such as senokot, pericolace, or milk of magnesia if needed.    No pain medication given in the recovery room    If your physician has prescribed pain medication that includes Acetaminophen (Tylenol), do not take additional Acetaminophen (Tylenol) while taking the prescribed medication.      Peripheral Nerve Block Discharge Instructions from Same Day Surgery and Inpatient :    What to Expect - Lower Extremity  · The block may cause you to experience numbness and weakness in your hip and thigh, thigh and knee or calf and foot on the same side as your surgery  · Numbness, tingling and / or weakness are all normal. For some people, this may be an unpleasant sensation  · These issues will be resolved when the local anesthetic wears off   · You may experience numbness and tingling in your thigh on the same side as your surgery if the block medicine was injected at your groin area  · Numbness will make it difficult to walk  · You may have problems with balance and walking so be very careful   · Follow your surgeon's direction regarding weight bearing on your surgical limb  · Be very careful with your numb limb  Precautions  · The numbness may affect your balance  · Be careful when walking or moving around  · Your leg may be weak: be very careful putting weight on it  · If your surgeon did not specify a time, you should not bear weight for 24  "hours  · Be sure to ask for help when you need it  · It is better to have help than to fall and hurt yourself  Prevent Injury  · Protect the limb like a baby  · Beware of exposing your limb to extreme heat or cold or trauma  · The limb may be injured without you noticing because it is numb  · Keep the limb elevated whenever possible  · Do not sleep on the limb  · Change the position of the limb regularly  · Avoid putting pressure on your surgical limb  Pain Control  · The initial block on the day of surgery will make your extremity feel \"numb\"  · Any consecutive injection including prior to discharge from the hospital will make your extremity feel \"numb\"  · You may feel an aching or burning when the local anesthesia starts to wear off  · Take pain pills as prescribed by your surgeon  · Call your surgeon or anesthesiologist if you do not have adequate pain control    Depression / Suicide Risk    As you are discharged from this [facility] facility, it is important to learn how to keep safe from harming yourself.    Recognize the warning signs:  · Abrupt changes in personality, positive or negative- including increase in energy   · Giving away possessions  · Change in eating patterns- significant weight changes-  positive or negative  · Change in sleeping patterns- unable to sleep or sleeping all the time   · Unwillingness or inability to communicate  · Depression  · Unusual sadness, discouragement and loneliness  · Talk of wanting to die  · Neglect of personal appearance   · Rebelliousness- reckless behavior  · Withdrawal from people/activities they love  · Confusion- inability to concentrate     If you or a loved one observes any of these behaviors or has concerns about self-harm, here's what you can do:  · Talk about it- your feelings and reasons for harming yourself  · Remove any means that you might use to hurt yourself (examples: pills, rope, extension cords, firearm)  · Get professional help from the " community (Mental Health, Substance Abuse, psychological counseling)  · Do not be alone:Call your Safe Contact- someone whom you trust who will be there for you.  · Call your local CRISIS HOTLINE 699-9384 or 386-873-4115  · Call your local Children's Mobile Crisis Response Team [place] (080) 091-5334 or www.[employer]  · Call the toll free National Suicide Prevention Hotlines   · National Suicide Prevention Lifeline 674-568-QTWJ (3743)  National Hope Line Network 800-SUICIDE (485-8213)

## 2021-09-27 PROBLEM — S82.842D BIMALLEOLAR ANKLE FRACTURE, LEFT, CLOSED, WITH ROUTINE HEALING, SUBSEQUENT ENCOUNTER: Status: ACTIVE | Noted: 2021-08-13

## 2021-11-17 ENCOUNTER — TELEPHONE (OUTPATIENT)
Dept: HEALTH INFORMATION MANAGEMENT | Facility: OTHER | Age: 71
End: 2021-11-17

## 2021-11-17 NOTE — TELEPHONE ENCOUNTER
Outcome: Scheduled [facility]     [facility] Verified: yes    [phone] [place] 1.1.2022    Attempt # 1

## 2021-12-01 ENCOUNTER — TELEPHONE (OUTPATIENT)
Dept: HEALTH INFORMATION MANAGEMENT | Facility: OTHER | Age: 71
End: 2021-12-01

## 2021-12-01 ENCOUNTER — TELEPHONE (OUTPATIENT)
Dept: SCHEDULING | Facility: IMAGING CENTER | Age: 71
End: 2021-12-01

## 2021-12-01 PROBLEM — R94.4 DECREASED GFR: Status: ACTIVE | Noted: 2021-12-01

## 2021-12-01 PROBLEM — Z85.3 HISTORY OF BREAST CANCER: Status: ACTIVE | Noted: 2021-12-01

## 2021-12-01 PROBLEM — S82.892P CLOSED FRACTURE OF LEFT ANKLE WITH MALUNION: Status: RESOLVED | Noted: 2021-08-08 | Resolved: 2021-12-01

## 2021-12-01 PROBLEM — S82.842D BIMALLEOLAR ANKLE FRACTURE, LEFT, CLOSED, WITH ROUTINE HEALING, SUBSEQUENT ENCOUNTER: Status: RESOLVED | Noted: 2021-08-13 | Resolved: 2021-12-01

## 2021-12-01 PROBLEM — Z87.81 HISTORY OF FRACTURE OF LEFT ANKLE: Status: ACTIVE | Noted: 2021-12-01

## 2021-12-10 NOTE — TELEPHONE ENCOUNTER
Outcome: [facility] follow up. No DME, Scheudled apointment in January with [facility] PCP, Reviewed Rx, Mychart already activated, SCP info reviewed.     Please transfer to Patient Outreach Team at [phone] when patient returns call.

## 2022-01-25 ENCOUNTER — OFFICE VISIT (OUTPATIENT)
Dept: MEDICAL GROUP | Facility: PHYSICIAN GROUP | Age: 72
End: 2022-01-25
Payer: MEDICARE

## 2022-01-25 VITALS
HEIGHT: 62 IN | BODY MASS INDEX: 26.54 KG/M2 | RESPIRATION RATE: 14 BRPM | HEART RATE: 77 BPM | SYSTOLIC BLOOD PRESSURE: 130 MMHG | TEMPERATURE: 97.4 F | WEIGHT: 144.2 LBS | DIASTOLIC BLOOD PRESSURE: 60 MMHG | OXYGEN SATURATION: 97 %

## 2022-01-25 DIAGNOSIS — E55.9 VITAMIN D DEFICIENCY: ICD-10-CM

## 2022-01-25 DIAGNOSIS — Z13.820 SCREENING FOR OSTEOPOROSIS: ICD-10-CM

## 2022-01-25 DIAGNOSIS — E03.9 ACQUIRED HYPOTHYROIDISM: ICD-10-CM

## 2022-01-25 DIAGNOSIS — I10 ESSENTIAL HYPERTENSION: ICD-10-CM

## 2022-01-25 DIAGNOSIS — R80.8 OTHER PROTEINURIA: ICD-10-CM

## 2022-01-25 DIAGNOSIS — E78.2 MIXED HYPERLIPIDEMIA: ICD-10-CM

## 2022-01-25 DIAGNOSIS — Z85.3 HISTORY OF BREAST CANCER: ICD-10-CM

## 2022-01-25 PROBLEM — E87.1 HYPONATREMIA: Status: RESOLVED | Noted: 2021-08-08 | Resolved: 2022-01-25

## 2022-01-25 PROBLEM — R94.4 DECREASED GFR: Status: RESOLVED | Noted: 2021-12-01 | Resolved: 2022-01-25

## 2022-01-25 PROBLEM — J34.2 DEVIATED NASAL SEPTUM: Status: RESOLVED | Noted: 2017-06-02 | Resolved: 2022-01-25

## 2022-01-25 PROCEDURE — 99203 OFFICE O/P NEW LOW 30 MIN: CPT | Performed by: INTERNAL MEDICINE

## 2022-01-25 RX ORDER — IRBESARTAN 150 MG/1
150 TABLET ORAL DAILY
COMMUNITY
Start: 2022-01-13 | End: 2022-01-25

## 2022-01-25 ASSESSMENT — PATIENT HEALTH QUESTIONNAIRE - PHQ9: CLINICAL INTERPRETATION OF PHQ2 SCORE: 0

## 2022-01-25 ASSESSMENT — FIBROSIS 4 INDEX: FIB4 SCORE: 1.38

## 2022-01-26 NOTE — PROGRESS NOTES
CC: Establish medical care.  Previous patient Bryce PCP    HPI:  Yuki presents with the following    1. Acquired hypothyroidism  Patient currently being treated with Synthroid 88 MCG's daily.  Patient is due for thyroid function panel.  No recent medication changes.    2. Essential hypertension  Patient is currently taking Avapro 75 mg daily.  Over the summer, patient had a episode of dizziness, fell and fractured her left ankle.  Noted that her blood pressure was too low and therefore decreased her dose to 75 mg to 150 mg.  Blood pressure at home average 130/70.  Asymptomatic.    3. History of breast cancer  History of breast cancer diagnosed 20 years ago.  Patient underwent lumpectomy with chemo and radiation.  Patient has a mammogram yearly which will be due this summer 2022.    4. Mixed hyperlipidemia  Currently taking Pravachol 40 mg tablets daily.  Due for lipid panel.    5. Vitamin D deficiency  Due for vitamin D level.  Taking multivitamin.    6. Other proteinuria  History of proteinuria and requesting a urinalysis.  Patient was seen and evaluated by nephrology and was monitoring her UA.        Patient Active Problem List    Diagnosis Date Noted   • Vitamin D deficiency 01/25/2022   • Other proteinuria 01/25/2022   • History of breast cancer 12/01/2021   • History of fracture of left ankle 12/01/2021   • Essential hypertension 08/08/2021   • Acquired hypothyroidism 08/08/2021   • Mixed hyperlipidemia 08/08/2021       Current Outpatient Medications   Medication Sig Dispense Refill   • Probiotic Product (PROBIOTIC DAILY PO) Take 1 Capsule by mouth every day.     • Multiple Vitamin (MULTIVITAMIN ADULT PO) Take 1 Tablet by mouth every day.     • ibuprofen (MOTRIN) 200 MG Tab Take 200 mg by mouth every 6 hours as needed.     • irbesartan (AVAPRO) 75 MG tablet Take 1 tablet by mouth every day. 30 tablet 0   • acetaminophen (TYLENOL) 325 MG Tab Take 650 mg by mouth every 6 hours as needed. 30 tablet 0   •  pravastatin (PRAVACHOL) 40 MG tablet Take 40 mg by mouth every evening.     • levothyroxine (SYNTHROID) 88 MCG Tab Take 88 mcg by mouth Every morning on an empty stomach.     • irbesartan (AVAPRO) 150 MG Tab Take 150 mg by mouth every day. (Patient not taking: Reported on 1/25/2022)       No current facility-administered medications for this visit.         Allergies as of 01/25/2022 - Reviewed 01/25/2022   Allergen Reaction Noted   • Tequin [gatifloxacin] Anaphylaxis, Hives, and Swelling 11/10/2014        Social History     Socioeconomic History   • Marital status: Single     Spouse name: Not on file   • Number of children: Not on file   • Years of education: Not on file   • Highest education level: Not on file   Occupational History   • Not on file   Tobacco Use   • Smoking status: Never Smoker   • Smokeless tobacco: Current User     Types: Chew   Substance and Sexual Activity   • Alcohol use: Yes     Alcohol/week: 0.6 oz     Types: 1 Shots of liquor per week     Comment: 5 nights   • Drug use: No   • Sexual activity: Not on file   Other Topics Concern   • Not on file   Social History Narrative   • Not on file     Social Determinants of Health     Financial Resource Strain:    • Difficulty of Paying Living Expenses: Not on file   Food Insecurity:    • Worried About Running Out of Food in the Last Year: Not on file   • Ran Out of Food in the Last Year: Not on file   Transportation Needs:    • Lack of Transportation (Medical): Not on file   • Lack of Transportation (Non-Medical): Not on file   Physical Activity:    • Days of Exercise per Week: Not on file   • Minutes of Exercise per Session: Not on file   Stress:    • Feeling of Stress : Not on file   Social Connections:    • Frequency of Communication with Friends and Family: Not on file   • Frequency of Social Gatherings with Friends and Family: Not on file   • Attends Evangelical Services: Not on file   • Active Member of Clubs or Organizations: Not on file   •  "Attends Club or Organization Meetings: Not on file   • Marital Status: Not on file   Intimate Partner Violence:    • Fear of Current or Ex-Partner: Not on file   • Emotionally Abused: Not on file   • Physically Abused: Not on file   • Sexually Abused: Not on file   Housing Stability:    • Unable to Pay for Housing in the Last Year: Not on file   • Number of Places Lived in the Last Year: Not on file   • Unstable Housing in the Last Year: Not on file       History reviewed. No pertinent family history.    Past Surgical History:   Procedure Laterality Date   • ORIF, ANKLE Left 8/17/2021    Procedure: ORIF, ANKLE;  Surgeon: Cesario Oneal M.D.;  Location: SURGERY [facility];  Service: Orthopedics   • SEPTOPLASTY N/A 6/2/2017    Procedure: SEPTOPLASTY;  Surgeon: Rodrick Coronado M.D.;  Location: SURGERY SAME DAY [facility];  Service:    • TURBINATE REDUCTION Bilateral 6/2/2017    Procedure: TURBINATE REDUCTION - RESECTION W/SUBMUCOSAL APPROACH;  Surgeon: Rodrick Coronado M.D.;  Location: SURGERY SAME DAY [facility]S;  Service:    • OTHER  2012    completed thyroidectomy   • GYN SURGERY  2003-04    3 or 4 D&Cs   • OTHER  1985    subtotal thyroidectomy   • GYN SURGERY  1975    tubal ligation       ROS:  Denies any Headache,Chest pain,  Shortness of breath,  Abdominal pain, Changes of bowel or bladder, Lower ext edema, Fevers, Nights sweats, Weight Changes, Focal weakness or numbness.  All other systems are negative.    /60 (BP Location: Left arm, Patient Position: Sitting, BP Cuff Size: Adult)   Pulse 77   Temp 36.3 °C (97.4 °F) (Temporal)   Resp 14   Ht 1.575 m (5' 2\")   Wt 65.4 kg (144 lb 3.2 oz)   SpO2 97%   BMI 26.37 kg/m²      Constitutional: Alert, no distress, well-groomed.  Skin: Warm, dry, good turgor, no rashes in visible areas.  Eye: Equal, round and reactive, conjunctiva clear, lids normal.  ENMT: Lips without lesions, good dentition, moist mucous membranes.  Neck: Trachea midline, no " masses, no thyromegaly.  Respiratory: Unlabored respiratory effort, no cough.  Abdomen: Soft, no gross masses.  MSK: Normal gait, moves all extremities.  Neuro: Grossly non-focal. No cranial nerve deficit. Strength and sensation intact.   Psych: Alert and oriented x3, normal affect and mood.      Assessment and Plan.   72 y.o. female presenting with the following.     1. Acquired hypothyroidism  Continue Synthroid 88 MCG daily.  - THYROID PANEL WITH TSH    2. Essential hypertension  Continue Avapro 75 mg daily.  - Comp Metabolic Panel; Future    3. History of breast cancer  Monitor with yearly mammograms.  SVE encouraged.    4. Mixed hyperlipidemia  Continue pravastatin at 40 mg daily.  - CBC WITH DIFFERENTIAL; Future  - Lipid Profile; Future    5. Vitamin D deficiency    - VITAMIN D,25 HYDROXY; Future    6. Other proteinuria    - URINE CULTURE(NEW); Future    7. Screening for osteoporosis    - DS-BONE DENSITY STUDY (DEXA); Future    My total time spent caring for the patient on the day of the encounter was 30 minutes.   This does not include time spent on separately billable procedures/tests.

## 2022-02-22 ENCOUNTER — HOSPITAL ENCOUNTER (OUTPATIENT)
Dept: LAB | Facility: MEDICAL CENTER | Age: 72
End: 2022-02-22
Attending: INTERNAL MEDICINE
Payer: MEDICARE

## 2022-02-22 DIAGNOSIS — I10 ESSENTIAL HYPERTENSION: ICD-10-CM

## 2022-02-22 DIAGNOSIS — R80.8 OTHER PROTEINURIA: ICD-10-CM

## 2022-02-22 DIAGNOSIS — E78.2 MIXED HYPERLIPIDEMIA: ICD-10-CM

## 2022-02-22 DIAGNOSIS — E55.9 VITAMIN D DEFICIENCY: ICD-10-CM

## 2022-02-22 LAB
25(OH)D3 SERPL-MCNC: 42 NG/ML (ref 30–100)
ALBUMIN SERPL BCP-MCNC: 4.5 G/DL (ref 3.2–4.9)
ALBUMIN/GLOB SERPL: 1.3 G/DL
ALP SERPL-CCNC: 63 U/L (ref 30–99)
ALT SERPL-CCNC: 15 U/L (ref 2–50)
ANION GAP SERPL CALC-SCNC: 11 MMOL/L (ref 7–16)
AST SERPL-CCNC: 14 U/L (ref 12–45)
BASOPHILS # BLD AUTO: 0.9 % (ref 0–1.8)
BASOPHILS # BLD: 0.04 K/UL (ref 0–0.12)
BILIRUB SERPL-MCNC: 0.8 MG/DL (ref 0.1–1.5)
BUN SERPL-MCNC: 19 MG/DL (ref 8–22)
CALCIUM SERPL-MCNC: 9.7 MG/DL (ref 8.5–10.5)
CHLORIDE SERPL-SCNC: 99 MMOL/L (ref 96–112)
CHOLEST SERPL-MCNC: 187 MG/DL (ref 100–199)
CO2 SERPL-SCNC: 25 MMOL/L (ref 20–33)
CREAT SERPL-MCNC: 0.76 MG/DL (ref 0.5–1.4)
EOSINOPHIL # BLD AUTO: 0.06 K/UL (ref 0–0.51)
EOSINOPHIL NFR BLD: 1.3 % (ref 0–6.9)
ERYTHROCYTE [DISTWIDTH] IN BLOOD BY AUTOMATED COUNT: 44.6 FL (ref 35.9–50)
GLOBULIN SER CALC-MCNC: 3.5 G/DL (ref 1.9–3.5)
GLUCOSE SERPL-MCNC: 89 MG/DL (ref 65–99)
HCT VFR BLD AUTO: 43.9 % (ref 37–47)
HDLC SERPL-MCNC: 66 MG/DL
HGB BLD-MCNC: 15.2 G/DL (ref 12–16)
IMM GRANULOCYTES # BLD AUTO: 0.01 K/UL (ref 0–0.11)
IMM GRANULOCYTES NFR BLD AUTO: 0.2 % (ref 0–0.9)
LDLC SERPL CALC-MCNC: 101 MG/DL
LYMPHOCYTES # BLD AUTO: 1.93 K/UL (ref 1–4.8)
LYMPHOCYTES NFR BLD: 42.1 % (ref 22–41)
MCH RBC QN AUTO: 31.7 PG (ref 27–33)
MCHC RBC AUTO-ENTMCNC: 34.6 G/DL (ref 33.6–35)
MCV RBC AUTO: 91.6 FL (ref 81.4–97.8)
MONOCYTES # BLD AUTO: 0.4 K/UL (ref 0–0.85)
MONOCYTES NFR BLD AUTO: 8.7 % (ref 0–13.4)
NEUTROPHILS # BLD AUTO: 2.14 K/UL (ref 2–7.15)
NEUTROPHILS NFR BLD: 46.8 % (ref 44–72)
NRBC # BLD AUTO: 0 K/UL
NRBC BLD-RTO: 0 /100 WBC
PLATELET # BLD AUTO: 249 K/UL (ref 164–446)
PMV BLD AUTO: 10.3 FL (ref 9–12.9)
POTASSIUM SERPL-SCNC: 3.9 MMOL/L (ref 3.6–5.5)
PROT SERPL-MCNC: 8 G/DL (ref 6–8.2)
RBC # BLD AUTO: 4.79 M/UL (ref 4.2–5.4)
SODIUM SERPL-SCNC: 135 MMOL/L (ref 135–145)
T4 FREE SERPL-MCNC: 1.68 NG/DL (ref 0.93–1.7)
TRIGL SERPL-MCNC: 102 MG/DL (ref 0–149)
TSH SERPL DL<=0.005 MIU/L-ACNC: 0.92 UIU/ML (ref 0.38–5.33)
WBC # BLD AUTO: 4.6 K/UL (ref 4.8–10.8)

## 2022-02-22 PROCEDURE — 80053 COMPREHEN METABOLIC PANEL: CPT

## 2022-02-22 PROCEDURE — 80061 LIPID PANEL: CPT

## 2022-02-22 PROCEDURE — 36415 COLL VENOUS BLD VENIPUNCTURE: CPT

## 2022-02-22 PROCEDURE — 87186 SC STD MICRODIL/AGAR DIL: CPT

## 2022-02-22 PROCEDURE — 84443 ASSAY THYROID STIM HORMONE: CPT

## 2022-02-22 PROCEDURE — 82306 VITAMIN D 25 HYDROXY: CPT

## 2022-02-22 PROCEDURE — 84439 ASSAY OF FREE THYROXINE: CPT

## 2022-02-22 PROCEDURE — 85025 COMPLETE CBC W/AUTO DIFF WBC: CPT

## 2022-02-22 PROCEDURE — 87086 URINE CULTURE/COLONY COUNT: CPT

## 2022-02-24 LAB
BACTERIA UR CULT: ABNORMAL
BACTERIA UR CULT: ABNORMAL
SIGNIFICANT IND 70042: ABNORMAL
SITE SITE: ABNORMAL
SOURCE SOURCE: ABNORMAL

## 2022-03-01 ENCOUNTER — OFFICE VISIT (OUTPATIENT)
Dept: MEDICAL GROUP | Facility: PHYSICIAN GROUP | Age: 72
End: 2022-03-01
Payer: MEDICARE

## 2022-03-01 VITALS
BODY MASS INDEX: 26.19 KG/M2 | OXYGEN SATURATION: 95 % | HEART RATE: 62 BPM | WEIGHT: 142.3 LBS | SYSTOLIC BLOOD PRESSURE: 130 MMHG | TEMPERATURE: 97.5 F | HEIGHT: 62 IN | DIASTOLIC BLOOD PRESSURE: 62 MMHG | RESPIRATION RATE: 16 BRPM

## 2022-03-01 DIAGNOSIS — R82.90 ABNORMAL URINALYSIS: ICD-10-CM

## 2022-03-01 DIAGNOSIS — E03.9 ACQUIRED HYPOTHYROIDISM: ICD-10-CM

## 2022-03-01 PROCEDURE — 99213 OFFICE O/P EST LOW 20 MIN: CPT | Performed by: INTERNAL MEDICINE

## 2022-03-01 RX ORDER — LEVOTHYROXINE SODIUM 88 UG/1
88 TABLET ORAL
Qty: 90 TABLET | Refills: 2 | Status: SHIPPED | OUTPATIENT
Start: 2022-03-01 | End: 2022-10-17

## 2022-03-01 ASSESSMENT — FIBROSIS 4 INDEX: FIB4 SCORE: 1.07

## 2022-03-01 NOTE — PROGRESS NOTES
CC: Follow-up lab work, urinalysis.    HPI:  Yuki presents with the following    1. Abnormal urinalysis  Patient with a PMH of proteinuria resulted in urine culture came back positive for E. coli.  Patient is asymptomatic.  No history of recurrent UTIs.  Kidney function within normal limits.    2. Acquired hypothyroidism  Patient would like a refill on her Synthroid 88 MCG's dose sent to mail in pharmacy.  TSH 0.9.  Asymptomatic.    3.  Labs: CBC, CMP, TSH, vitamin D within normal limits.    Patient Active Problem List    Diagnosis Date Noted   • Vitamin D deficiency 01/25/2022   • Other proteinuria 01/25/2022   • History of breast cancer 12/01/2021   • History of fracture of left ankle 12/01/2021   • Essential hypertension 08/08/2021   • Acquired hypothyroidism 08/08/2021   • Mixed hyperlipidemia 08/08/2021       Current Outpatient Medications   Medication Sig Dispense Refill   • levothyroxine (SYNTHROID) 88 MCG Tab Take 1 Tablet by mouth every morning on an empty stomach. 90 Tablet 2   • Probiotic Product (PROBIOTIC DAILY PO) Take 1 Capsule by mouth every day.     • Multiple Vitamin (MULTIVITAMIN ADULT PO) Take 1 Tablet by mouth every day.     • ibuprofen (MOTRIN) 200 MG Tab Take 200 mg by mouth every 6 hours as needed.     • irbesartan (AVAPRO) 75 MG tablet Take 1 tablet by mouth every day. 30 tablet 0   • acetaminophen (TYLENOL) 325 MG Tab Take 650 mg by mouth every 6 hours as needed. 30 tablet 0   • pravastatin (PRAVACHOL) 40 MG tablet Take 40 mg by mouth every evening.       No current facility-administered medications for this visit.         Allergies as of 03/01/2022 - Reviewed 03/01/2022   Allergen Reaction Noted   • Tequin [gatifloxacin] Anaphylaxis, Hives, and Swelling 11/10/2014        Social History     Socioeconomic History   • Marital status: Single     Spouse name: Not on file   • Number of children: Not on file   • Years of education: Not on file   • Highest education level: Not on file  "  Occupational History   • Not on file   Tobacco Use   • Smoking status: Never Smoker   • Smokeless tobacco: Current User     Types: Chew   Substance and Sexual Activity   • Alcohol use: Yes     Alcohol/week: 0.6 oz     Types: 1 Shots of liquor per week     Comment: 5 nights   • Drug use: No   • Sexual activity: Not on file   Other Topics Concern   • Not on file   Social History Narrative   • Not on file     Social Determinants of Health     Financial Resource Strain: Not on file   Food Insecurity: Not on file   Transportation Needs: Not on file   Physical Activity: Not on file   Stress: Not on file   Social Connections: Not on file   Intimate Partner Violence: Not on file   Housing Stability: Not on file       History reviewed. No pertinent family history.    Past Surgical History:   Procedure Laterality Date   • ORIF, ANKLE Left 8/17/2021    Procedure: ORIF, ANKLE;  Surgeon: Cesario Oneal M.D.;  Location: SURGERY [facility];  Service: Orthopedics   • SEPTOPLASTY N/A 6/2/2017    Procedure: SEPTOPLASTY;  Surgeon: Rodrick Coronado M.D.;  Location: SURGERY SAME DAY [facility];  Service:    • TURBINATE REDUCTION Bilateral 6/2/2017    Procedure: TURBINATE REDUCTION - RESECTION W/SUBMUCOSAL APPROACH;  Surgeon: Rodrick Coronado M.D.;  Location: SURGERY SAME DAY [facility] ORS;  Service:    • OTHER  2012    completed thyroidectomy   • GYN SURGERY  2003-04    3 or 4 D&Cs   • OTHER  1985    subtotal thyroidectomy   • GYN SURGERY  1975    tubal ligation       ROS:  Denies any Headache,Chest pain,  Shortness of breath,  Abdominal pain, Changes of bowel or bladder, Lower ext edema, Fevers, Nights sweats, Weight Changes, Focal weakness or numbness.  All other systems are negative.    /62 (BP Location: Left arm, Patient Position: Sitting, BP Cuff Size: Adult)   Pulse 62   Temp 36.4 °C (97.5 °F) (Temporal)   Resp 16   Ht 1.575 m (5' 2\")   Wt 64.5 kg (142 lb 4.8 oz)   SpO2 95%   BMI 26.03 kg/m²    "   Constitutional: Alert, no distress, well-groomed.  Skin: Warm, dry, good turgor, no rashes in visible areas.  Eye: Equal, round and reactive, conjunctiva clear, lids normal.  ENMT: Lips without lesions, good dentition, moist mucous membranes.  Neck: Trachea midline, no masses, no thyromegaly.  Respiratory: Unlabored respiratory effort, no cough.  Abdomen: Soft, no gross masses.  MSK: Normal gait, moves all extremities.  Neuro: Grossly non-focal. No cranial nerve deficit. Strength and sensation intact.   Psych: Alert and oriented x3, normal affect and mood.      Assessment and Plan.   72 y.o. female presenting with the following.     1. Abnormal urinalysis  UA within normal limits.  - POCT Urinalysis    2. Acquired hypothyroidism  Synthroid sent to mail in pharmacy.    My total time spent caring for the patient on the day of the encounter was 20 minutes.   This does not include time spent on separately billable procedures/tests.

## 2022-04-29 ENCOUNTER — HOSPITAL ENCOUNTER (OUTPATIENT)
Dept: RADIOLOGY | Facility: MEDICAL CENTER | Age: 72
End: 2022-04-29
Attending: INTERNAL MEDICINE
Payer: MEDICARE

## 2022-04-29 DIAGNOSIS — Z13.820 SCREENING FOR OSTEOPOROSIS: ICD-10-CM

## 2022-04-29 PROCEDURE — 77080 DXA BONE DENSITY AXIAL: CPT

## 2022-06-07 ENCOUNTER — HOSPITAL ENCOUNTER (OUTPATIENT)
Dept: RADIOLOGY | Facility: MEDICAL CENTER | Age: 72
End: 2022-06-07
Payer: MEDICARE

## 2022-06-20 PROBLEM — M25.572 ANKLE PAIN, LEFT: Status: ACTIVE | Noted: 2022-06-20

## 2022-06-22 RX ORDER — PRAVASTATIN SODIUM 40 MG
40 TABLET ORAL NIGHTLY
Qty: 100 TABLET | Refills: 1 | Status: SHIPPED | OUTPATIENT
Start: 2022-06-22 | End: 2023-01-10 | Stop reason: SDUPTHER

## 2022-06-22 RX ORDER — IRBESARTAN 75 MG/1
75 TABLET ORAL DAILY
Qty: 100 TABLET | Refills: 1 | Status: SHIPPED | OUTPATIENT
Start: 2022-06-22 | End: 2023-01-10 | Stop reason: SDUPTHER

## 2022-06-30 PROBLEM — M25.572 ACUTE LEFT ANKLE PAIN: Status: ACTIVE | Noted: 2022-06-30

## 2022-08-01 ENCOUNTER — HOSPITAL ENCOUNTER (OUTPATIENT)
Dept: RADIOLOGY | Facility: MEDICAL CENTER | Age: 72
End: 2022-08-01
Attending: INTERNAL MEDICINE
Payer: MEDICARE

## 2022-08-01 DIAGNOSIS — Z12.31 VISIT FOR SCREENING MAMMOGRAM: ICD-10-CM

## 2022-08-01 PROCEDURE — 77063 BREAST TOMOSYNTHESIS BI: CPT

## 2022-09-07 ENCOUNTER — OFFICE VISIT (OUTPATIENT)
Dept: MEDICAL GROUP | Facility: PHYSICIAN GROUP | Age: 72
End: 2022-09-07
Payer: MEDICARE

## 2022-09-07 VITALS
WEIGHT: 151.1 LBS | HEART RATE: 67 BPM | OXYGEN SATURATION: 95 % | RESPIRATION RATE: 16 BRPM | HEIGHT: 63 IN | BODY MASS INDEX: 26.77 KG/M2 | DIASTOLIC BLOOD PRESSURE: 70 MMHG | TEMPERATURE: 98.6 F | SYSTOLIC BLOOD PRESSURE: 124 MMHG

## 2022-09-07 DIAGNOSIS — Z11.59 NEED FOR HEPATITIS C SCREENING TEST: ICD-10-CM

## 2022-09-07 DIAGNOSIS — E55.9 VITAMIN D DEFICIENCY: ICD-10-CM

## 2022-09-07 DIAGNOSIS — Z85.3 HISTORY OF BREAST CANCER: ICD-10-CM

## 2022-09-07 DIAGNOSIS — E03.9 ACQUIRED HYPOTHYROIDISM: ICD-10-CM

## 2022-09-07 DIAGNOSIS — Z00.00 ENCOUNTER FOR SUBSEQUENT ANNUAL WELLNESS VISIT (AWV) IN MEDICARE PATIENT: ICD-10-CM

## 2022-09-07 DIAGNOSIS — S82.892D CLOSED FRACTURE OF LEFT ANKLE WITH ROUTINE HEALING: ICD-10-CM

## 2022-09-07 DIAGNOSIS — E78.2 MIXED HYPERLIPIDEMIA: ICD-10-CM

## 2022-09-07 DIAGNOSIS — I10 ESSENTIAL HYPERTENSION: ICD-10-CM

## 2022-09-07 DIAGNOSIS — Z12.11 SCREENING FOR COLON CANCER: ICD-10-CM

## 2022-09-07 PROCEDURE — G0439 PPPS, SUBSEQ VISIT: HCPCS | Performed by: INTERNAL MEDICINE

## 2022-09-07 ASSESSMENT — PATIENT HEALTH QUESTIONNAIRE - PHQ9: CLINICAL INTERPRETATION OF PHQ2 SCORE: 0

## 2022-09-07 ASSESSMENT — FIBROSIS 4 INDEX: FIB4 SCORE: 1.07

## 2022-09-07 ASSESSMENT — ENCOUNTER SYMPTOMS: GENERAL WELL-BEING: GOOD

## 2022-09-07 ASSESSMENT — ACTIVITIES OF DAILY LIVING (ADL): BATHING_REQUIRES_ASSISTANCE: 0

## 2022-09-07 NOTE — PROGRESS NOTES
Subjective:     CC:   Chief Complaint   Patient presents with    Follow-Up     Lat OV 3/1/22  Mammogram 8/1/22  Last labs 2/22/22  2nd surgery left ankle in June 2022       HPI:   Yuki Carabalol is a 72 y.o. female who presents for annual exam:    1. Encounter for subsequent annual wellness visit (AWV) in Medicare patient  Annual wellness exam  - Subsequent Annual Wellness Visit - Includes PPPS ()    2. Acquired hypothyroidism  Patient currently taking Synthroid 88 MCG's daily.  Patient gained about 10 pounds since last visit due to dietary changes.  - Subsequent Annual Wellness Visit - Includes PPPS ()  - TSH WITH REFLEX TO FT4; Future  - T3 FREE; Future    3. Closed fracture of left ankle with routine healing  Patient is status post initial ankle surgery in August 2021.  Patient experienced discomfort and swelling and underwent second surgery in June with hardware removal and scar tissue management.  Patient is doing HEP and had her 6-week follow-up appointment with JUVENTINO.  Patient is a bit discouraged but doing better.  - Subsequent Annual Wellness Visit - Includes PPPS ()    4. Essential hypertension  Blood pressure stable with decreasing Avapro to 75 mg daily.  - Subsequent Annual Wellness Visit - Includes PPPS ()    5. History of breast cancer  Up-to-date with mammogram.  - Subsequent Annual Wellness Visit - Includes PPPS ()    6. Mixed hyperlipidemia  Lipid panel in good range.  Taking Pravachol 40 mg daily.  - Subsequent Annual Wellness Visit - Includes PPPS ()  - Comp Metabolic Panel; Future  - CBC WITH DIFFERENTIAL; Future  - Lipid Profile; Future    7. Screening for colon cancer  Patient declines colonoscopy.  Last colonoscopy was in her 50s.  No family history of colon cancer.  No stool changes.  - COLOGUARD (FIT DNA)  - Subsequent Annual Wellness Visit - Includes PPPS ()    8. Vitamin D deficiency  Taking vitamin D supplements.  - Subsequent Annual Wellness Visit -  Includes PPPS ()  - VITAMIN D,25 HYDROXY (DEFICIENCY); Future        Patient has GYN provider: No   Last Pap Smear: long time ago    H/O Abnormal Pap: No  Last Mammogram: August 2022  Last Bone Density Test: 4/29/22  Last Colorectal Cancer Screening: long time ago   Last Tdap: does not remember  Received HPV series: Patient does not recall    Exercise: minimal exercise, one hour walking weekly  Diet: Diet is ok       No LMP recorded. Patient is postmenopausal.  She has not utilized hormone replacement therapy.  Denies any menopausal symptoms.  No significant bloating/fluid retention, pelvic pain, or dyspareunia. No abnormal vaginal discharge.   No breast tenderness, mass, nipple discharge or changes in size or contour.    OB History   No obstetric history on file.      She  has no history on file for sexual activity.    She  has a past medical history of Ankle pain, Arthritis, Cancer (HCC), Disorder of thyroid, Heart burn, Hemorrhagic disorder (HCC), Hypertension, Other proteinuria (1/25/2022), and Vitamin D deficiency (1/25/2022).  She  has a past surgical history that includes gyn surgery (2003-04); gyn surgery (1975); other (1985); other (2012); septoplasty (N/A, 6/2/2017); turbinate reduction (Bilateral, 6/2/2017); orif, ankle (Left, 8/17/2021); pr removal deep implant (Left, 6/30/2022); and pr repair flex leg tendon,prim,ea (Left, 6/30/2022).    History reviewed. No pertinent family history.  Social History     Tobacco Use    Smoking status: Never    Smokeless tobacco: Current     Types: Chew   Vaping Use    Vaping Use: Never used   Substance Use Topics    Alcohol use: Not Currently     Alcohol/week: 4.2 oz     Types: 7 Standard drinks or equivalent per week    Drug use: No       Patient Active Problem List    Diagnosis Date Noted    Acute left ankle pain 06/30/2022    Ankle pain, left 06/20/2022    Vitamin D deficiency 01/25/2022    Other proteinuria 01/25/2022    History of breast cancer 12/01/2021     "History of fracture of left ankle 12/01/2021    Essential hypertension 08/08/2021    Acquired hypothyroidism 08/08/2021    Mixed hyperlipidemia 08/08/2021     Current Outpatient Medications   Medication Sig Dispense Refill    irbesartan (AVAPRO) 75 MG tablet Take 1 Tablet by mouth every day. 100 Tablet 1    pravastatin (PRAVACHOL) 40 MG tablet Take 1 Tablet by mouth every evening. 100 Tablet 1    levothyroxine (SYNTHROID) 88 MCG Tab Take 1 Tablet by mouth every morning on an empty stomach. 90 Tablet 2    Probiotic Product (PROBIOTIC DAILY PO) Take 1 Capsule by mouth every day.      Multiple Vitamin (MULTIVITAMIN ADULT PO) Take 1 Tablet by mouth every day.      ibuprofen (MOTRIN) 200 MG Tab Take 200 mg by mouth every 6 hours as needed.      acetaminophen (TYLENOL) 325 MG Tab Take 650 mg by mouth every 6 hours as needed. 30 tablet 0    gabapentin (NEURONTIN) 300 MG Cap 1 po q hs prn nerve pain 7 Capsule 0     No current facility-administered medications for this visit.     Allergies   Allergen Reactions    Tequin [Gatifloxacin] Anaphylaxis, Hives and Swelling       Review of Systems   Constitutional: Negative for fever, chills and malaise/fatigue.   HENT: Negative for congestion.    Eyes: Negative for pain.   Respiratory: Negative for cough and shortness of breath.    Cardiovascular: Negative for chest pain and leg swelling.   Gastrointestinal: Negative for nausea, vomiting, abdominal pain and diarrhea.   Genitourinary: Negative for dysuria and hematuria.   Skin: Negative for rash.   Neurological: Negative for dizziness, focal weakness and headaches.   Endo/Heme/Allergies: Does not bruise/bleed easily.   Psychiatric/Behavioral: Negative for depression.  The patient is not nervous/anxious.      Objective:   /70 (BP Location: Left arm, Patient Position: Sitting, BP Cuff Size: Adult)   Pulse 67   Temp 37 °C (98.6 °F) (Temporal)   Resp 16   Ht 1.6 m (5' 3\")   Wt 68.5 kg (151 lb 1.6 oz)   SpO2 95%   BMI 26.77 " kg/m²     Wt Readings from Last 4 Encounters:   09/07/22 68.5 kg (151 lb 1.6 oz)   06/30/22 65.5 kg (144 lb 4.7 oz)   03/01/22 64.5 kg (142 lb 4.8 oz)   01/25/22 65.4 kg (144 lb 3.2 oz)       A chaperone was offered to the patient during today's exam. Patient declined chaperone.    Physical Exam:  Constitutional: Well-developed and well-nourished. Not diaphoretic. No distress.   Skin: Skin is warm and dry. No rash noted.  Head: Atraumatic without lesions.  Eyes: Conjunctivae and extraocular motions are normal. Pupils are equal, round, and reactive to light. No scleral icterus.   Ears:  External ears unremarkable. Tympanic membranes clear and intact.  Nose: Nares patent. Septum midline. Turbinates without erythema nor edema. No discharge.   Mouth/Throat: Tongue normal. Oropharynx is clear and moist. Posterior pharynx without erythema or exudates.  Neck: Supple, trachea midline. Normal range of motion. No thyromegaly present. No lymphadenopathy--cervical or supraclavicular.  Cardiovascular: Regular rate and rhythm, S1 and S2 without murmur, rubs, or gallops.    Respiratory: Effort normal. Clear to auscultation throughout. No adventitious sounds.   Breast: Breasts examined seated and supine. No skin changes, peau d'orange or nipple retraction. No discharge. No axillary or supraclavicular adenopathy. No masses or nodularity palpable.  Abdomen: Soft, non tender, and without distention. Active bowel sounds in all four quadrants. No rebound, guarding, masses or HSM.  Extremities: No cyanosis, clubbing, erythema, nor edema. Distal pulses intact and symmetric.   Musculoskeletal: All major joints AROM full in all directions without pain.  Neurological: Alert and oriented x 3. Grossly non-focal. Strength and sensation grossly intact. DTRs 2+/3 and symmetric.   Psychiatric:  Behavior, mood, and affect are appropriate.    Assessment and Plan:     There are no diagnoses linked to this encounter.    1. Encounter for subsequent  annual wellness visit (AWV) in Medicare patient  Annual wellness visit.  - Subsequent Annual Wellness Visit - Includes PPPS ()    2. Acquired hypothyroidism  Continue Synthroid at current dose.  - Subsequent Annual Wellness Visit - Includes PPPS ()  - TSH WITH REFLEX TO FT4; Future  - T3 FREE; Future    3. Closed fracture of left ankle with routine healing  Stable.  Follow-up with JUVENTINO for routine healing.  Continue HEP.  Modified exercises discussed.  - Subsequent Annual Wellness Visit - Includes PPPS ()    4. Essential hypertension  Continue Avapro 75 mg daily.  - Subsequent Annual Wellness Visit - Includes PPPS ()    5. History of breast cancer  Continue monitor with mammogram.  Breast exam benign.  SBE discussed.  - Subsequent Annual Wellness Visit - Includes PPPS ()    6. Mixed hyperlipidemia  Continue Pravachol at current dose.  - Subsequent Annual Wellness Visit - Includes PPPS ()  - Comp Metabolic Panel; Future  - CBC WITH DIFFERENTIAL; Future  - Lipid Profile; Future    7. Screening for colon cancer    - COLOGUARD (FIT DNA)  - Subsequent Annual Wellness Visit - Includes PPPS ()    8. Vitamin D deficiency    - Subsequent Annual Wellness Visit - Includes PPPS ()  - VITAMIN D,25 HYDROXY (DEFICIENCY); Future    9. Need for hepatitis C screening test    - Subsequent Annual Wellness Visit - Includes PPPS ()  - HEP C VIRUS ANTIBODY; Future    10.  Need for vaccine.  Patient due for flu vaccine, Shingrix.    Health maintenance:    Labs per orders  Immunizations per orders  Patient counseled about skin care, diet, supplements, and exercise.  Discussed  breast self exam     Follow-up: No follow-ups on file.

## 2022-09-22 ENCOUNTER — TELEPHONE (OUTPATIENT)
Dept: MEDICAL GROUP | Facility: PHYSICIAN GROUP | Age: 72
End: 2022-09-22
Payer: MEDICARE

## 2022-09-22 DIAGNOSIS — Z12.11 SCREENING FOR COLON CANCER: ICD-10-CM

## 2022-09-22 NOTE — TELEPHONE ENCOUNTER
Lab called stating a fit test was received for this patient and it does not have the correct order to it. Please order a fit test so the lab can process the sample.

## 2022-11-07 ENCOUNTER — PATIENT MESSAGE (OUTPATIENT)
Dept: HEALTH INFORMATION MANAGEMENT | Facility: OTHER | Age: 72
End: 2022-11-07

## 2023-01-03 ENCOUNTER — HOSPITAL ENCOUNTER (OUTPATIENT)
Dept: LAB | Facility: MEDICAL CENTER | Age: 73
End: 2023-01-03
Attending: INTERNAL MEDICINE
Payer: MEDICARE

## 2023-01-03 DIAGNOSIS — E78.2 MIXED HYPERLIPIDEMIA: ICD-10-CM

## 2023-01-03 DIAGNOSIS — Z11.59 NEED FOR HEPATITIS C SCREENING TEST: ICD-10-CM

## 2023-01-03 DIAGNOSIS — E03.9 ACQUIRED HYPOTHYROIDISM: ICD-10-CM

## 2023-01-03 DIAGNOSIS — E55.9 VITAMIN D DEFICIENCY: ICD-10-CM

## 2023-01-03 LAB
25(OH)D3 SERPL-MCNC: 52 NG/ML (ref 30–100)
ALBUMIN SERPL BCP-MCNC: 4.3 G/DL (ref 3.2–4.9)
ALBUMIN/GLOB SERPL: 1.3 G/DL
ALP SERPL-CCNC: 69 U/L (ref 30–99)
ALT SERPL-CCNC: 18 U/L (ref 2–50)
ANION GAP SERPL CALC-SCNC: 10 MMOL/L (ref 7–16)
AST SERPL-CCNC: 16 U/L (ref 12–45)
BASOPHILS # BLD AUTO: 1 % (ref 0–1.8)
BASOPHILS # BLD: 0.06 K/UL (ref 0–0.12)
BILIRUB SERPL-MCNC: 0.7 MG/DL (ref 0.1–1.5)
BUN SERPL-MCNC: 17 MG/DL (ref 8–22)
CALCIUM ALBUM COR SERPL-MCNC: 9.5 MG/DL (ref 8.5–10.5)
CALCIUM SERPL-MCNC: 9.7 MG/DL (ref 8.5–10.5)
CHLORIDE SERPL-SCNC: 101 MMOL/L (ref 96–112)
CHOLEST SERPL-MCNC: 222 MG/DL (ref 100–199)
CO2 SERPL-SCNC: 28 MMOL/L (ref 20–33)
CREAT SERPL-MCNC: 0.77 MG/DL (ref 0.5–1.4)
EOSINOPHIL # BLD AUTO: 0.07 K/UL (ref 0–0.51)
EOSINOPHIL NFR BLD: 1.2 % (ref 0–6.9)
ERYTHROCYTE [DISTWIDTH] IN BLOOD BY AUTOMATED COUNT: 45.1 FL (ref 35.9–50)
GFR SERPLBLD CREATININE-BSD FMLA CKD-EPI: 80 ML/MIN/1.73 M 2
GLOBULIN SER CALC-MCNC: 3.2 G/DL (ref 1.9–3.5)
GLUCOSE SERPL-MCNC: 100 MG/DL (ref 65–99)
HCT VFR BLD AUTO: 42.6 % (ref 37–47)
HCV AB SER QL: NORMAL
HDLC SERPL-MCNC: 64 MG/DL
HGB BLD-MCNC: 14.7 G/DL (ref 12–16)
IMM GRANULOCYTES # BLD AUTO: 0.02 K/UL (ref 0–0.11)
IMM GRANULOCYTES NFR BLD AUTO: 0.3 % (ref 0–0.9)
LDLC SERPL CALC-MCNC: 122 MG/DL
LYMPHOCYTES # BLD AUTO: 1.99 K/UL (ref 1–4.8)
LYMPHOCYTES NFR BLD: 33.7 % (ref 22–41)
MCH RBC QN AUTO: 32.2 PG (ref 27–33)
MCHC RBC AUTO-ENTMCNC: 34.5 G/DL (ref 33.6–35)
MCV RBC AUTO: 93.4 FL (ref 81.4–97.8)
MONOCYTES # BLD AUTO: 0.47 K/UL (ref 0–0.85)
MONOCYTES NFR BLD AUTO: 8 % (ref 0–13.4)
NEUTROPHILS # BLD AUTO: 3.3 K/UL (ref 2–7.15)
NEUTROPHILS NFR BLD: 55.8 % (ref 44–72)
NRBC # BLD AUTO: 0 K/UL
NRBC BLD-RTO: 0 /100 WBC
PLATELET # BLD AUTO: 272 K/UL (ref 164–446)
PMV BLD AUTO: 9.6 FL (ref 9–12.9)
POTASSIUM SERPL-SCNC: 3.7 MMOL/L (ref 3.6–5.5)
PROT SERPL-MCNC: 7.5 G/DL (ref 6–8.2)
RBC # BLD AUTO: 4.56 M/UL (ref 4.2–5.4)
SODIUM SERPL-SCNC: 139 MMOL/L (ref 135–145)
T3FREE SERPL-MCNC: 2.68 PG/ML (ref 2–4.4)
TRIGL SERPL-MCNC: 181 MG/DL (ref 0–149)
TSH SERPL DL<=0.005 MIU/L-ACNC: 2.47 UIU/ML (ref 0.38–5.33)
WBC # BLD AUTO: 5.9 K/UL (ref 4.8–10.8)

## 2023-01-03 PROCEDURE — 80053 COMPREHEN METABOLIC PANEL: CPT

## 2023-01-03 PROCEDURE — 85025 COMPLETE CBC W/AUTO DIFF WBC: CPT

## 2023-01-03 PROCEDURE — 80061 LIPID PANEL: CPT

## 2023-01-03 PROCEDURE — 82306 VITAMIN D 25 HYDROXY: CPT

## 2023-01-03 PROCEDURE — 86803 HEPATITIS C AB TEST: CPT

## 2023-01-03 PROCEDURE — 36415 COLL VENOUS BLD VENIPUNCTURE: CPT

## 2023-01-03 PROCEDURE — 84443 ASSAY THYROID STIM HORMONE: CPT

## 2023-01-03 PROCEDURE — 84481 FREE ASSAY (FT-3): CPT

## 2023-01-10 ENCOUNTER — OFFICE VISIT (OUTPATIENT)
Dept: MEDICAL GROUP | Facility: PHYSICIAN GROUP | Age: 73
End: 2023-01-10
Payer: MEDICARE

## 2023-01-10 VITALS
SYSTOLIC BLOOD PRESSURE: 130 MMHG | BODY MASS INDEX: 28.7 KG/M2 | OXYGEN SATURATION: 95 % | RESPIRATION RATE: 16 BRPM | WEIGHT: 162 LBS | HEIGHT: 63 IN | TEMPERATURE: 99 F | HEART RATE: 95 BPM | DIASTOLIC BLOOD PRESSURE: 70 MMHG

## 2023-01-10 DIAGNOSIS — S82.892D CLOSED FRACTURE OF LEFT ANKLE WITH ROUTINE HEALING: ICD-10-CM

## 2023-01-10 DIAGNOSIS — E78.2 MIXED HYPERLIPIDEMIA: ICD-10-CM

## 2023-01-10 DIAGNOSIS — R63.5 WEIGHT GAIN: ICD-10-CM

## 2023-01-10 DIAGNOSIS — Z12.11 COLON CANCER SCREENING: ICD-10-CM

## 2023-01-10 DIAGNOSIS — I10 ESSENTIAL HYPERTENSION: ICD-10-CM

## 2023-01-10 DIAGNOSIS — E55.9 VITAMIN D DEFICIENCY: ICD-10-CM

## 2023-01-10 DIAGNOSIS — E03.9 ACQUIRED HYPOTHYROIDISM: ICD-10-CM

## 2023-01-10 PROBLEM — M25.572 ACUTE LEFT ANKLE PAIN: Status: RESOLVED | Noted: 2022-06-30 | Resolved: 2023-01-10

## 2023-01-10 PROBLEM — M25.572 ANKLE PAIN, LEFT: Status: RESOLVED | Noted: 2022-06-20 | Resolved: 2023-01-10

## 2023-01-10 PROCEDURE — RXMED WILLOW AMBULATORY MEDICATION CHARGE: Performed by: INTERNAL MEDICINE

## 2023-01-10 PROCEDURE — 99214 OFFICE O/P EST MOD 30 MIN: CPT | Performed by: INTERNAL MEDICINE

## 2023-01-10 RX ORDER — IRBESARTAN 75 MG/1
75 TABLET ORAL DAILY
Qty: 100 TABLET | Refills: 1 | Status: SHIPPED | OUTPATIENT
Start: 2023-01-10 | End: 2023-07-03 | Stop reason: SDUPTHER

## 2023-01-10 RX ORDER — PRAVASTATIN SODIUM 40 MG
40 TABLET ORAL NIGHTLY
Qty: 100 TABLET | Refills: 1 | Status: SHIPPED | OUTPATIENT
Start: 2023-01-10 | End: 2023-07-03 | Stop reason: SDUPTHER

## 2023-01-10 ASSESSMENT — VISUAL ACUITY
OD_CC: 20/40
OS_CC: 20/25

## 2023-01-10 ASSESSMENT — PATIENT HEALTH QUESTIONNAIRE - PHQ9: CLINICAL INTERPRETATION OF PHQ2 SCORE: 0

## 2023-01-10 ASSESSMENT — FIBROSIS 4 INDEX: FIB4 SCORE: 1.03

## 2023-01-10 NOTE — PROGRESS NOTES
CC: Follow-up lab work, weight gain, ankle fracture.    HPI:  Yuki presents with the following    1. Colon cancer screening  Patient declines screening colonoscopy.  Patient completed a screening colonoscopy at age 48.  Cologuard ordered, patient has not completed.    2. Essential hypertension  Requesting a refill on Avapro 75 mg daily.  Requesting to send to [facility] mail order.    3. Acquired hypothyroidism  Patient taking Synthroid 88 MCG's daily.  TSH level 2.4.  Stable.    4. Mixed hyperlipidemia  Patient treated with Pravachol 40 mg daily.  Total cholesterol 228, up from 181, triglyceride 181, up from 102, .  Patient retired last month and has not been able to exercise due to ankle injury.  Patient is taking care of her grandchildren and has not been focusing on her diet.    5. Vitamin D deficiency  Vitamin D level 52.  Taking over-the-counter supplementation.    6. Closed fracture of left ankle with routine healing  Patient is status post initial ankle surgery in August 2021.  Patient experienced discomfort and swelling and underwent second surgery in June 2022 with hardware removal and scar tissue management.  Patient completed HEP and 6-week follow-up with JUVENTINO.  Repeat x-rays completed in July which showed interval hardware removal.  Patient continues to suffer with ankle discomfort, stiffness, pain and numbness and tingling.  Patient feels unstable, has not been able to exercise and has been discouraged.  Patient considering physical therapy with a new company called [facility] with Daniel Castro.  Patient will be traveling to [place] in April and would like to feel stable and strong for her trip.    7. Weight gain  TSH 2.4.  Patient has been emotional eating and not exercising due to ankle surgery last summer.  Patient has gained about 20 pounds, current weight 162 pounds, down from 144 pounds in June 2022.      Patient Active Problem List    Diagnosis Date Noted    Closed fracture of left  ankle with routine healing 09/07/2022    Vitamin D deficiency 01/25/2022    Other proteinuria 01/25/2022    History of breast cancer 12/01/2021    History of fracture of left ankle 12/01/2021    Essential hypertension 08/08/2021    Acquired hypothyroidism 08/08/2021    Mixed hyperlipidemia 08/08/2021       Current Outpatient Medications   Medication Sig Dispense Refill    pravastatin (PRAVACHOL) 40 MG tablet Take 1 Tablet by mouth every evening. 100 Tablet 1    irbesartan (AVAPRO) 75 MG tablet Take 1 Tablet by mouth every day. 100 Tablet 1    SYNTHROID 88 MCG Tab TAKE 1 TABLET EVERY MORNING ON AN EMPTY STOMACH 100 Tablet 2    Probiotic Product (PROBIOTIC DAILY PO) Take 1 Capsule by mouth every day.      Multiple Vitamin (MULTIVITAMIN ADULT PO) Take 1 Tablet by mouth every day.      ibuprofen (MOTRIN) 200 MG Tab Take 200 mg by mouth every 6 hours as needed.      acetaminophen (TYLENOL) 325 MG Tab Take 650 mg by mouth every 6 hours as needed. 30 tablet 0     No current facility-administered medications for this visit.         Allergies as of 01/10/2023 - Reviewed 01/10/2023   Allergen Reaction Noted    Tequin [gatifloxacin] Anaphylaxis, Hives, and Swelling 11/10/2014        Social History     Socioeconomic History    Marital status: Single     Spouse name: Not on file    Number of children: Not on file    Years of education: Not on file    Highest education level: Not on file   Occupational History    Not on file   Tobacco Use    Smoking status: Never    Smokeless tobacco: Current     Types: Chew   Vaping Use    Vaping Use: Never used   Substance and Sexual Activity    Alcohol use: Not Currently     Alcohol/week: 4.2 oz     Types: 7 Standard drinks or equivalent per week    Drug use: No    Sexual activity: Not on file   Other Topics Concern    Not on file   Social History Narrative    Not on file     Social Determinants of Health     Financial Resource Strain: Not on file   Food Insecurity: Not on file  "  Transportation Needs: Not on file   Physical Activity: Not on file   Stress: Not on file   Social Connections: Not on file   Intimate Partner Violence: Not on file   Housing Stability: Not on file       History reviewed. No pertinent family history.    Past Surgical History:   Procedure Laterality Date    PB REMOVAL DEEP IMPLANT Left 6/30/2022    Procedure: LEFT ANKLE ARTHROSCOPY, LEFT REMOVAL OF HARDWARE;  Surgeon: Ozzie Cannon M.D.;  Location: [facility];  Service: Orthopedics    PB REPAIR FLEX LEG TENDON,PBIM,EA Left 6/30/2022    Procedure: LEFT POSTERIOR TIBIAL TENDON REPAIR, , REPAIRS AS INDICATED;  Surgeon: Ozzie Cannon M.D.;  Location: [facility];  Service: Orthopedics    ORIF, ANKLE Left 8/17/2021    Procedure: ORIF, ANKLE;  Surgeon: Cesario Oneal M.D.;  Location: SURGERY [facility];  Service: Orthopedics    SEPTOPLASTY N/A 6/2/2017    Procedure: SEPTOPLASTY;  Surgeon: Rodrick Coronado M.D.;  Location: SURGERY SAME DAY [facility];  Service:     TURBINATE REDUCTION Bilateral 6/2/2017    Procedure: TURBINATE REDUCTION - RESECTION W/SUBMUCOSAL APPROACH;  Surgeon: Rodrick Coronado M.D.;  Location: SURGERY SAME DAY [facility];  Service:     OTHER  2012    completed thyroidectomy    GYN SURGERY  2003-04    3 or 4 D&Cs    OTHER  1985    subtotal thyroidectomy    GYN SURGERY  1975    tubal ligation       ROS: Positive ROS per HPI.  Denies any Headache,Chest pain,  Shortness of breath,  Abdominal pain, Changes of bowel or bladder, Lower ext edema, Fevers, Nights sweats, Weight Changes, Focal weakness or numbness.  All other systems are negative.    /70   Pulse 95   Temp 37.2 °C (99 °F) (Temporal)   Resp 16   Ht 1.6 m (5' 3\")   Wt 73.5 kg (162 lb)   SpO2 95%   BMI 28.70 kg/m²      Constitutional: Alert, no distress, well-groomed.  Skin: Warm, dry, good turgor, no rashes in visible areas.  Eye: Equal, round and reactive, conjunctiva clear, " lids normal.  ENMT: Lips without lesions, good dentition, moist mucous membranes.  Neck: Trachea midline, no masses, no thyromegaly.  Respiratory: Unlabored respiratory effort, no cough.  Abdomen: Soft, no gross masses.  MSK: Normal gait, moves all extremities.  Neuro: Grossly non-focal. No cranial nerve deficit. Strength and sensation intact.   Psych: Alert and oriented x3, normal affect and mood.    Assessment and Plan.   72 y.o. female presenting with the following.     1. Colon cancer screening  Patient declined screening colonoscopy.    2. Essential hypertension  Refill Avapro 75 mg daily.  Blood pressure stable.    3. Acquired hypothyroidism  Continue current Synthroid 88 MCG's daily.  TSH within good range.    4. Mixed hyperlipidemia  Continue Pravachol 40 mg daily.  Counseled patient on diet and lifestyle modifications.    5. Vitamin D deficiency  Stable.  Continue OTC supplementation.    6. Closed fracture of left ankle with routine healing    - Referral to Physical Therapy    7. Weight gain  Counseled patient on diet and lifestyle modifications.  Referral to physical therapy to regain length, stability to restart physical activity.    My total time spent caring for the patient on the day of the encounter was 33 minutes.   This does not include time spent on separately billable procedures/tests.

## 2023-01-13 ENCOUNTER — PHARMACY VISIT (OUTPATIENT)
Dept: PHARMACY | Facility: MEDICAL CENTER | Age: 73
End: 2023-01-13
Payer: COMMERCIAL

## 2023-02-02 ENCOUNTER — PHARMACY VISIT (OUTPATIENT)
Dept: PHARMACY | Facility: MEDICAL CENTER | Age: 73
End: 2023-02-02
Payer: COMMERCIAL

## 2023-02-02 PROCEDURE — RXMED WILLOW AMBULATORY MEDICATION CHARGE: Performed by: DENTIST

## 2023-02-02 RX ORDER — AMOXICILLIN 875 MG/1
875 TABLET, COATED ORAL 2 TIMES DAILY
Qty: 14 TABLET | Refills: 0 | Status: SHIPPED | OUTPATIENT
Start: 2023-02-02 | End: 2023-03-14

## 2023-03-14 ENCOUNTER — OFFICE VISIT (OUTPATIENT)
Dept: MEDICAL GROUP | Facility: PHYSICIAN GROUP | Age: 73
End: 2023-03-14
Payer: MEDICARE

## 2023-03-14 VITALS
BODY MASS INDEX: 29.54 KG/M2 | DIASTOLIC BLOOD PRESSURE: 66 MMHG | HEIGHT: 63 IN | TEMPERATURE: 98.7 F | OXYGEN SATURATION: 93 % | WEIGHT: 166.7 LBS | HEART RATE: 89 BPM | SYSTOLIC BLOOD PRESSURE: 110 MMHG | RESPIRATION RATE: 12 BRPM

## 2023-03-14 DIAGNOSIS — Z12.11 SCREENING FOR COLON CANCER: ICD-10-CM

## 2023-03-14 DIAGNOSIS — S82.892D CLOSED FRACTURE OF LEFT ANKLE WITH ROUTINE HEALING: ICD-10-CM

## 2023-03-14 DIAGNOSIS — R63.5 WEIGHT GAIN: ICD-10-CM

## 2023-03-14 PROCEDURE — 99214 OFFICE O/P EST MOD 30 MIN: CPT | Performed by: INTERNAL MEDICINE

## 2023-03-14 ASSESSMENT — FIBROSIS 4 INDEX: FIB4 SCORE: 1.039862913509628712

## 2023-03-14 NOTE — PROGRESS NOTES
CC: 3-month follow-up, ankle fracture.    HPI:  Yuki presents with the following    1. Closed fracture of left ankle with routine healing  1/10/2023:Patient is status post initial ankle surgery in August 2021.  Patient experienced discomfort and swelling and underwent second surgery in June 2022 with hardware removal and scar tissue management.  Patient completed HEP and 6-week follow-up with JUVENTINO.  Repeat x-rays completed in July which showed interval hardware removal.  Patient continues to suffer with ankle discomfort, stiffness, pain and numbness and tingling.  Patient feels unstable, has not been able to exercise and has been discouraged.  Patient considering physical therapy with a new company called [facility] with Daniel Castro.  Patient will be traveling to [place] in April and would like to feel stable and strong for her trip.    3/14/2022:.  Patient was referred to physical therapy and attended 4 sessions.  She is receiving home physical therapy for strengthening and balance for upcoming trip to [place].  She is a bit nervous about her trip.  She will be flying alone in [place] for 1 month.  Concerned about lots of walking on her trip.    2. Weight gain  1/10/2023:TSH 2.4.  Patient has been emotional eating and not exercising due to ankle surgery last summer.  Patient has gained about 20 pounds, current weight 162 pounds, down from 144 pounds in June 2022.    3/14/2023:.  Patient's current weight is 166 pounds.  BMI 29.33.  She has not been exercising aerobically due to cold weather.    3. Screening for colon cancer  Patient declines screening colonoscopy.  Patient completed a screening colonoscopy at age 48. Cologuard ordered, patient has not completed.      Patient Active Problem List    Diagnosis Date Noted    Closed fracture of left ankle with routine healing 09/07/2022    Vitamin D deficiency 01/25/2022    Other proteinuria 01/25/2022    History of breast cancer 12/01/2021    History of fracture of left  ankle 12/01/2021    Essential hypertension 08/08/2021    Acquired hypothyroidism 08/08/2021    Mixed hyperlipidemia 08/08/2021       Current Outpatient Medications   Medication Sig Dispense Refill    pravastatin (PRAVACHOL) 40 MG tablet Take 1 tablet by mouth every evening. 100 Tablet 1    irbesartan (AVAPRO) 75 MG tablet Take 1 tablet by mouth every day. 100 Tablet 1    SYNTHROID 88 MCG Tab TAKE 1 TABLET EVERY MORNING ON AN EMPTY STOMACH 100 Tablet 2    Probiotic Product (PROBIOTIC DAILY PO) Take 1 Capsule by mouth every day.      Multiple Vitamin (MULTIVITAMIN ADULT PO) Take 1 Tablet by mouth every day.      ibuprofen (MOTRIN) 200 MG Tab Take 200 mg by mouth every 6 hours as needed.      acetaminophen (TYLENOL) 325 MG Tab Take 650 mg by mouth every 6 hours as needed. 30 tablet 0     No current facility-administered medications for this visit.         Allergies as of 03/14/2023 - Reviewed 03/14/2023   Allergen Reaction Noted    Tequin [gatifloxacin] Anaphylaxis, Hives, and Swelling 11/10/2014        Social History     Socioeconomic History    Marital status: Single     Spouse name: Not on file    Number of children: Not on file    Years of education: Not on file    Highest education level: Not on file   Occupational History    Not on file   Tobacco Use    Smoking status: Never    Smokeless tobacco: Current     Types: Chew   Vaping Use    Vaping Use: Never used   Substance and Sexual Activity    Alcohol use: Not Currently     Alcohol/week: 4.2 oz     Types: 7 Standard drinks or equivalent per week    Drug use: No    Sexual activity: Not on file   Other Topics Concern    Not on file   Social History Narrative    Not on file     Social Determinants of Health     Financial Resource Strain: Not on file   Food Insecurity: Not on file   Transportation Needs: Not on file   Physical Activity: Not on file   Stress: Not on file   Social Connections: Not on file   Intimate Partner Violence: Not on file   Housing Stability: Not  "on file       History reviewed. No pertinent family history.    Past Surgical History:   Procedure Laterality Date    PB REMOVAL DEEP IMPLANT Left 6/30/2022    Procedure: LEFT ANKLE ARTHROSCOPY, LEFT REMOVAL OF HARDWARE;  Surgeon: Ozzie Cannon M.D.;  Location: [facility];  Service: Orthopedics    PB REPAIR FLEX LEG TENDON,PBIM,EA Left 6/30/2022    Procedure: LEFT POSTERIOR TIBIAL TENDON REPAIR, , REPAIRS AS INDICATED;  Surgeon: Ozzie Cannon M.D.;  Location: [facility];  Service: Orthopedics    ORIF, ANKLE Left 8/17/2021    Procedure: ORIF, ANKLE;  Surgeon: Cesario Oneal M.D.;  Location: SURGERY [facility];  Service: Orthopedics    SEPTOPLASTY N/A 6/2/2017    Procedure: SEPTOPLASTY;  Surgeon: Rodrick Coronado M.D.;  Location: SURGERY SAME DAY [facility];  Service:     TURBINATE REDUCTION Bilateral 6/2/2017    Procedure: TURBINATE REDUCTION - RESECTION W/SUBMUCOSAL APPROACH;  Surgeon: Rodrick Coronado M.D.;  Location: SURGERY SAME DAY [facility];  Service:     OTHER  2012    completed thyroidectomy    GYN SURGERY  2003-04    3 or 4 D&Cs    OTHER  1985    subtotal thyroidectomy    GYN SURGERY  1975    tubal ligation       ROS: Positive ROS per HPI.  Denies any Headache,Chest pain,  Shortness of breath,  Abdominal pain, Changes of bowel or bladder, Lower ext edema, Fevers, Nights sweats, Weight Changes, Focal weakness or numbness.  All other systems are negative.    /66   Pulse 89   Temp 37.1 °C (98.7 °F) (Temporal)   Resp 12   Ht 1.6 m (5' 3\")   Wt 75.6 kg (166 lb 11.2 oz)   SpO2 93%   BMI 29.53 kg/m²      Constitutional: Alert, no distress, well-groomed.  Skin: Warm, dry, good turgor, no rashes in visible areas.  Eye: Equal, round and reactive, conjunctiva clear, lids normal.  ENMT: Lips without lesions, good dentition, moist mucous membranes.  Neck: Trachea midline, no masses, no thyromegaly.  Respiratory: Unlabored respiratory effort, no " cough.  Abdomen: Soft, no gross masses.  MSK: Normal gait, moves all extremities.  Neuro: Grossly non-focal. No cranial nerve deficit. Strength and sensation intact.   Psych: Alert and oriented x3, normal affect and mood.    Assessment and Plan.   73 y.o. female presenting with the following.     1. Closed fracture of left ankle with routine healing  Chronic.  Stable.  Offered counseling plan exercise modifications, balance and strengthening exercises at home.  Continue with HEP.    2. Weight gain  Counseled patient on weight management, modifications for aerobic activity within the home.  Stair climbing for strength and aerobic activity.    3. Screening for colon cancer  Patient will complete Cologuard.    My total time spent caring for the patient on the day of the encounter was 30 minutes.   This does not include time spent on separately billable procedures/tests.

## 2023-03-20 ENCOUNTER — PHARMACY VISIT (OUTPATIENT)
Dept: PHARMACY | Facility: MEDICAL CENTER | Age: 73
End: 2023-03-20
Payer: COMMERCIAL

## 2023-03-20 PROCEDURE — RXMED WILLOW AMBULATORY MEDICATION CHARGE: Performed by: INTERNAL MEDICINE

## 2023-05-10 ENCOUNTER — PHARMACY VISIT (OUTPATIENT)
Dept: PHARMACY | Facility: MEDICAL CENTER | Age: 73
End: 2023-05-10
Payer: COMMERCIAL

## 2023-05-10 PROCEDURE — RXMED WILLOW AMBULATORY MEDICATION CHARGE: Performed by: DENTIST

## 2023-05-10 RX ORDER — AMOXICILLIN 500 MG/1
500 CAPSULE ORAL 4 TIMES DAILY
Qty: 28 CAPSULE | Refills: 0 | OUTPATIENT
Start: 2023-05-10

## 2023-05-25 NOTE — TELEPHONE ENCOUNTER
Called & spoke to pt  - Confirmed abx completed & no s/s of infection  - Ok to proceed w/ [facility] infusion as scheduled   Received request via: Patient    Was the patient seen in the last year in this department? Yes    Does the patient have an active prescription (recently filled or refills available) for medication(s) requested? No

## 2023-06-30 ENCOUNTER — DOCUMENTATION (OUTPATIENT)
Dept: HEALTH INFORMATION MANAGEMENT | Facility: OTHER | Age: 73
End: 2023-06-30
Payer: MEDICARE

## 2023-07-03 PROCEDURE — RXMED WILLOW AMBULATORY MEDICATION CHARGE: Performed by: INTERNAL MEDICINE

## 2023-07-03 RX ORDER — PRAVASTATIN SODIUM 40 MG
40 TABLET ORAL NIGHTLY
Qty: 100 TABLET | Refills: 1 | Status: SHIPPED | OUTPATIENT
Start: 2023-07-03 | End: 2024-02-15 | Stop reason: SDUPTHER

## 2023-07-03 RX ORDER — IRBESARTAN 75 MG/1
75 TABLET ORAL DAILY
Qty: 100 TABLET | Refills: 1 | Status: SHIPPED | OUTPATIENT
Start: 2023-07-03 | End: 2024-02-05 | Stop reason: SDUPTHER

## 2023-07-05 ENCOUNTER — PHARMACY VISIT (OUTPATIENT)
Dept: PHARMACY | Facility: MEDICAL CENTER | Age: 73
End: 2023-07-05
Payer: COMMERCIAL

## 2023-08-14 ENCOUNTER — HOSPITAL ENCOUNTER (OUTPATIENT)
Dept: RADIOLOGY | Facility: MEDICAL CENTER | Age: 73
End: 2023-08-14
Attending: INTERNAL MEDICINE
Payer: MEDICARE

## 2023-08-14 DIAGNOSIS — Z12.31 VISIT FOR SCREENING MAMMOGRAM: ICD-10-CM

## 2023-08-14 PROCEDURE — 77063 BREAST TOMOSYNTHESIS BI: CPT

## 2023-09-14 ENCOUNTER — OFFICE VISIT (OUTPATIENT)
Dept: MEDICAL GROUP | Facility: PHYSICIAN GROUP | Age: 73
End: 2023-09-14
Payer: MEDICARE

## 2023-09-14 VITALS
TEMPERATURE: 98.8 F | HEIGHT: 63 IN | WEIGHT: 167 LBS | BODY MASS INDEX: 29.59 KG/M2 | SYSTOLIC BLOOD PRESSURE: 118 MMHG | OXYGEN SATURATION: 96 % | DIASTOLIC BLOOD PRESSURE: 68 MMHG | HEART RATE: 77 BPM

## 2023-09-14 DIAGNOSIS — F43.21 SITUATIONAL DEPRESSION: ICD-10-CM

## 2023-09-14 DIAGNOSIS — S82.892D CLOSED FRACTURE OF LEFT ANKLE WITH ROUTINE HEALING: ICD-10-CM

## 2023-09-14 PROCEDURE — 3074F SYST BP LT 130 MM HG: CPT | Performed by: INTERNAL MEDICINE

## 2023-09-14 PROCEDURE — 99214 OFFICE O/P EST MOD 30 MIN: CPT | Performed by: INTERNAL MEDICINE

## 2023-09-14 PROCEDURE — 3078F DIAST BP <80 MM HG: CPT | Performed by: INTERNAL MEDICINE

## 2023-09-14 PROCEDURE — RXMED WILLOW AMBULATORY MEDICATION CHARGE: Performed by: INTERNAL MEDICINE

## 2023-09-14 RX ORDER — DULOXETIN HYDROCHLORIDE 20 MG/1
20 CAPSULE, DELAYED RELEASE ORAL DAILY
Qty: 100 CAPSULE | Refills: 1 | Status: SHIPPED | OUTPATIENT
Start: 2023-09-14

## 2023-09-14 ASSESSMENT — FIBROSIS 4 INDEX: FIB4 SCORE: 1.039862913509628712

## 2023-09-14 NOTE — PROGRESS NOTES
CC: Ankle pain    HPI:  Yuki presents with the following    1. Closed fracture of left ankle with routine healing  1/10/2023:Patient is status post initial ankle surgery in August 2021.  Patient experienced discomfort and swelling and underwent second surgery in June 2022 with hardware removal and scar tissue management.  Patient completed HEP and 6-week follow-up with [facility].  Repeat x-rays completed in July which showed interval hardware removal.  Patient continues to suffer with ankle discomfort, stiffness, pain and numbness and tingling.  Patient feels unstable, has not been able to exercise and has been discouraged.  Patient considering physical therapy with a new company called [employer] with Danieladriel Wooan.  Patient will be traveling to [facility] in April and would like to feel stable and strong for her trip.     3/14/2022:.  Patient was referred to physical therapy and attended 4 sessions.  She is receiving home physical therapy for strengthening and balance for upcoming trip to [place].  She is a bit nervous about her trip.  She will be flying alone in [place] for 1 month.  Concerned about lots of walking on her trip.    9/14/2023:.  Unfortunately, patient has continued pain in her left ankle.  She followed up with her orthopedist who recommended possible inserts.  She completed physical therapy and also HEP.  She returned from [facility] after a 1 month long trip and had difficulty with walking.  It is now affecting her quality of life as she is hesitant to leave the home besides short distances to the store.      2. Situational depression  Patient is a bit tearful today.  She has been under a lot of stress in the last few months.  She retired December 2022.  She enjoyed her job but decided to retire due to the physical nature of her job.  She is now having trouble finding purpose.  She had a long trip to [place] [employer] however had difficulty on her trip secondary to pain in her left ankle.  There are also many family  stressors.  She feels unmotivated and has become more of a recluse in her home.  Her social outlet is her sisters.      Patient Active Problem List    Diagnosis Date Noted    Situational depression 09/14/2023    Closed fracture of left ankle with routine healing 09/07/2022    Vitamin D deficiency 01/25/2022    Other proteinuria 01/25/2022    History of breast cancer 12/01/2021    History of fracture of left ankle 12/01/2021    Essential hypertension 08/08/2021    Acquired hypothyroidism 08/08/2021    Mixed hyperlipidemia 08/08/2021       Current Outpatient Medications   Medication Sig Dispense Refill    DULoxetine (CYMBALTA) 20 MG Cap DR Particles Take 1 Capsule by mouth every day. 100 Capsule 1    SYNTHROID 88 MCG Tab TAKE 1 TABLET EVERY MORNING ON AN EMPTY STOMACH 100 Tablet 3    pravastatin (PRAVACHOL) 40 MG tablet Take 1 tablet by mouth every evening. 100 Tablet 1    irbesartan (AVAPRO) 75 MG tablet Take 1 tablet by mouth every day. 100 Tablet 1    Probiotic Product (PROBIOTIC DAILY PO) Take 1 Capsule by mouth every day.      Multiple Vitamin (MULTIVITAMIN ADULT PO) Take 1 Tablet by mouth every day.      ibuprofen (MOTRIN) 200 MG Tab Take 200 mg by mouth every 6 hours as needed.      acetaminophen (TYLENOL) 325 MG Tab Take 650 mg by mouth every 6 hours as needed. 30 tablet 0    amoxicillin (AMOXIL) 500 MG Cap Take 1 Capsule by mouth 4 times a day. 28 Capsule 0     No current facility-administered medications for this visit.         Allergies as of 09/14/2023 - Reviewed 09/14/2023   Allergen Reaction Noted    Tequin [gatifloxacin] Anaphylaxis, Hives, and Swelling 11/10/2014        Social History     Socioeconomic History    Marital status: Single     Spouse name: Not on file    Number of children: Not on file    Years of education: Not on file    Highest education level: Not on file   Occupational History    Not on file   Tobacco Use    Smoking status: Never    Smokeless tobacco: Current     Types: Chew   Vaping  Use    Vaping Use: Never used   Substance and Sexual Activity    Alcohol use: Not Currently     Alcohol/week: 4.2 oz     Types: 7 Standard drinks or equivalent per week    Drug use: No    Sexual activity: Not on file   Other Topics Concern    Not on file   Social History Narrative    Not on file     Social Determinants of Health     Financial Resource Strain: Not on file   Food Insecurity: Not on file   Transportation Needs: Not on file   Physical Activity: Not on file   Stress: Not on file   Social Connections: Not on file   Intimate Partner Violence: Not on file   Housing Stability: Not on file       History reviewed. No pertinent family history.    Past Surgical History:   Procedure Laterality Date    PB REMOVAL DEEP IMPLANT Left 6/30/2022    Procedure: LEFT ANKLE ARTHROSCOPY, LEFT REMOVAL OF HARDWARE;  Surgeon: Ozzie Cannon M.D.;  Location: [facility];  Service: Orthopedics    PB REPAIR FLEX LEG TENDON,PBIM,EA Left 6/30/2022    Procedure: LEFT POSTERIOR TIBIAL TENDON REPAIR, , REPAIRS AS INDICATED;  Surgeon: Ozzie Cannon M.D.;  Location: [facility];  Service: Orthopedics    ORIF, ANKLE Left 8/17/2021    Procedure: ORIF, ANKLE;  Surgeon: Cesario Oneal M.D.;  Location: SURGERY [facility];  Service: Orthopedics    SEPTOPLASTY N/A 6/2/2017    Procedure: SEPTOPLASTY;  Surgeon: Rodrick Coronaod M.D.;  Location: SURGERY SAME DAY [facility];  Service:     TURBINATE REDUCTION Bilateral 6/2/2017    Procedure: TURBINATE REDUCTION - RESECTION W/SUBMUCOSAL APPROACH;  Surgeon: Rodrick Coronado M.D.;  Location: SURGERY SAME DAY [facility];  Service:     OTHER  2012    completed thyroidectomy    GYN SURGERY  2003-04    3 or 4 D&Cs    OTHER  1985    subtotal thyroidectomy    GYN SURGERY  1975    tubal ligation       ROS: Positive ROS per HPI.  Denies any Headache,Chest pain,  Shortness of breath,  Abdominal pain, Changes of bowel or bladder, Lower ext edema, Fevers,  "Nights sweats, Weight Changes, Focal weakness or numbness.  All other systems are negative.    /68 (BP Location: Left arm, BP Cuff Size: Adult)   Pulse 77   Temp 37.1 °C (98.8 °F) (Temporal)   Ht 1.6 m (5' 3\")   Wt 75.8 kg (167 lb)   SpO2 96%   BMI 29.58 kg/m²      Constitutional: Alert, no distress, well-groomed.  Skin: Warm, dry, good turgor, no rashes in visible areas.  Eye: Equal, round and reactive, conjunctiva clear, lids normal.  ENMT: Lips without lesions, good dentition, moist mucous membranes.  Neck: Trachea midline, no masses, no thyromegaly.  Respiratory: Unlabored respiratory effort, no cough.  Abdomen: Soft, no gross masses.  MSK: Normal gait, moves all extremities.  Neuro: Grossly non-focal. No cranial nerve deficit. Strength and sensation intact.   Psych: Alert and oriented x3, normal affect and mood.        Assessment and Plan.   73 y.o. female presenting with the following.     1. Closed fracture of left ankle with routine healing    - Referral to Podiatry    2. Situational depression  Offered extensive counseling today.  Trial with Cymbalta 20 mg p.o. daily.  Discussed the benefits of self-care, regular physical activity.  RTC 4 to 6 weeks to evaluate.  Consider behavioral therapy.    My total time spent caring for the patient on the day of the encounter was 32 minutes.   This does not include time spent on separately billable procedures/tests.      "

## 2023-09-18 ENCOUNTER — PHARMACY VISIT (OUTPATIENT)
Dept: PHARMACY | Facility: MEDICAL CENTER | Age: 73
End: 2023-09-18
Payer: COMMERCIAL

## 2023-10-17 ENCOUNTER — APPOINTMENT (OUTPATIENT)
Dept: MEDICAL GROUP | Facility: PHYSICIAN GROUP | Age: 73
End: 2023-10-17
Payer: MEDICARE

## 2023-10-27 PROCEDURE — RXMED WILLOW AMBULATORY MEDICATION CHARGE: Performed by: INTERNAL MEDICINE

## 2023-11-02 ENCOUNTER — PHARMACY VISIT (OUTPATIENT)
Dept: PHARMACY | Facility: MEDICAL CENTER | Age: 73
End: 2023-11-02
Payer: COMMERCIAL

## 2023-12-27 ENCOUNTER — TELEPHONE (OUTPATIENT)
Dept: SCHEDULING | Facility: IMAGING CENTER | Age: 73
End: 2023-12-27

## 2023-12-27 ENCOUNTER — APPOINTMENT (OUTPATIENT)
Dept: URGENT CARE | Facility: CLINIC | Age: 73
End: 2023-12-27
Payer: MEDICARE

## 2023-12-27 ENCOUNTER — HOSPITAL ENCOUNTER (EMERGENCY)
Facility: MEDICAL CENTER | Age: 73
End: 2023-12-27
Attending: STUDENT IN AN ORGANIZED HEALTH CARE EDUCATION/TRAINING PROGRAM
Payer: MEDICARE

## 2023-12-27 VITALS
TEMPERATURE: 97.3 F | SYSTOLIC BLOOD PRESSURE: 162 MMHG | HEIGHT: 62 IN | HEART RATE: 73 BPM | BODY MASS INDEX: 30.95 KG/M2 | RESPIRATION RATE: 18 BRPM | DIASTOLIC BLOOD PRESSURE: 70 MMHG | OXYGEN SATURATION: 92 % | WEIGHT: 168.21 LBS

## 2023-12-27 DIAGNOSIS — R04.0 BLOODY NOSE: ICD-10-CM

## 2023-12-27 DIAGNOSIS — R04.0 EPISTAXIS: ICD-10-CM

## 2023-12-27 PROCEDURE — A9270 NON-COVERED ITEM OR SERVICE: HCPCS | Performed by: STUDENT IN AN ORGANIZED HEALTH CARE EDUCATION/TRAINING PROGRAM

## 2023-12-27 PROCEDURE — 700101 HCHG RX REV CODE 250: Performed by: STUDENT IN AN ORGANIZED HEALTH CARE EDUCATION/TRAINING PROGRAM

## 2023-12-27 PROCEDURE — 99284 EMERGENCY DEPT VISIT MOD MDM: CPT

## 2023-12-27 PROCEDURE — 700102 HCHG RX REV CODE 250 W/ 637 OVERRIDE(OP): Performed by: STUDENT IN AN ORGANIZED HEALTH CARE EDUCATION/TRAINING PROGRAM

## 2023-12-27 RX ORDER — TRANEXAMIC ACID 100 MG/ML
3 INJECTION, SOLUTION INTRAVENOUS ONCE
Status: COMPLETED | OUTPATIENT
Start: 2023-12-27 | End: 2023-12-27

## 2023-12-27 RX ORDER — OXYMETAZOLINE HYDROCHLORIDE 0.05 G/100ML
2 SPRAY NASAL ONCE
Status: COMPLETED | OUTPATIENT
Start: 2023-12-27 | End: 2023-12-27

## 2023-12-27 RX ADMIN — TRANEXAMIC ACID 300 MG: 100 INJECTION, SOLUTION INTRAVENOUS at 03:39

## 2023-12-27 RX ADMIN — OXYMETAZOLINE HCL 2 SPRAY: 0.05 SPRAY NASAL at 03:39

## 2023-12-27 ASSESSMENT — PAIN DESCRIPTION - PAIN TYPE: TYPE: ACUTE PAIN

## 2023-12-27 ASSESSMENT — FIBROSIS 4 INDEX: FIB4 SCORE: 1.039862913509628712

## 2023-12-27 NOTE — ED PROVIDER NOTES
CHIEF COMPLAINT  Chief Complaint   Patient presents with    Epistaxis     X1 week pt has had consistent nosebleeds, pt states the worst one was tonight and that it started around 2100 and has not stopped, - blood thinners     hx of nosebleeds and has required interventions in the past         HPI    Yuki Caraballo is a 73 y.o. female who presents to the Emergency Department presents for evaluation of an epistaxis, which she states has been occurring intermittently over the past week though she has been able to control it at home with Afrin and a nose clamp.  Most recently at 9 PM tonight she developed a nosebleed in her left naris which have persisted despite Afrin, as well as the nasal clamping prompting a visit to the ER.  No blood thinners antiplatelets.  Denies any dizziness lightheadedness syncope no trauma no falls.    OUTSIDE HISTORIAN(S):  Select: reyter at bedside states nose bleed started around 9 pm    EXTERNAL RECORDS REVIEWED  Select: Other Reviewed medication list, no blood thinners or antiplatelets      PAST MEDICAL HISTORY  Past Medical History:   Diagnosis Date    Ankle pain     Arthritis     hands    Cancer (HCC)     Breast CA Surgery Chemo radiation    Closed fracture of left ankle with routine healing 9/7/2022    Disorder of thyroid     removed noduls    Heart burn     Hemorrhagic disorder (HCC)     nose bleeds, reason for surgery    Hypertension     working with primary doctor to find right medication, none currently    Other proteinuria 1/25/2022    Situational depression 9/14/2023    Vitamin D deficiency 1/25/2022     .    SURGICAL HISTORY  Past Surgical History:   Procedure Laterality Date    PB REMOVAL DEEP IMPLANT Left 6/30/2022    Procedure: LEFT ANKLE ARTHROSCOPY, LEFT REMOVAL OF HARDWARE;  Surgeon: Ozzie Cannon M.D.;  Location: [facility] [place];  Service: Orthopedics    PB REPAIR FLEX LEG TENDON,PBIM,EA Left 6/30/2022    Procedure: LEFT POSTERIOR TIBIAL TENDON  REPAIR, , REPAIRS AS INDICATED;  Surgeon: Ozzie Cannon M.D.;  Location: [facility] [place];  Service: Orthopedics    ORIF, ANKLE Left 8/17/2021    Procedure: ORIF, ANKLE;  Surgeon: Cesario Oneal M.D.;  Location: SURGERY [facility];  Service: Orthopedics    SEPTOPLASTY N/A 6/2/2017    Procedure: SEPTOPLASTY;  Surgeon: Rodrick Coronado M.D.;  Location: SURGERY SAME DAY [facility];  Service:     TURBINATE REDUCTION Bilateral 6/2/2017    Procedure: TURBINATE REDUCTION - RESECTION W/SUBMUCOSAL APPROACH;  Surgeon: Rodrick Coronado M.D.;  Location: SURGERY SAME DAY [facility];  Service:     OTHER  2012    completed thyroidectomy    GYN SURGERY  2003-04    3 or 4 D&Cs    OTHER  1985    subtotal thyroidectomy    GYN SURGERY  1975    tubal ligation         FAMILY HISTORY  No family history on file.       SOCIAL HISTORY  Social History     Socioeconomic History    Marital status: Single     Spouse name: Not on file    Number of children: Not on file    Years of education: Not on file    Highest education level: Not on file   Occupational History    Not on file   Tobacco Use    Smoking status: Never    Smokeless tobacco: Current     Types: Chew   Vaping Use    Vaping Use: Never used   Substance and Sexual Activity    Alcohol use: Not Currently     Alcohol/week: 4.2 oz     Types: 7 Standard drinks or equivalent per week    Drug use: No    Sexual activity: Not on file   Other Topics Concern    Not on file   Social History Narrative    Not on file     Social Determinants of Health     Financial Resource Strain: Not on file   Food Insecurity: Not on file   Transportation Needs: Not on file   Physical Activity: Not on file   Stress: Not on file   Social Connections: Not on file   Intimate Partner Violence: Not on file   Housing Stability: Not on file         CURRENT MEDICATIONS  No current facility-administered medications on file prior to encounter.     Current Outpatient Medications on File Prior to  "Encounter   Medication Sig Dispense Refill    DULoxetine (CYMBALTA) 20 MG Cap DR Particles Take 1 Capsule by mouth every day. 100 Capsule 1    SYNTHROID 88 MCG Tab TAKE 1 TABLET EVERY MORNING ON AN EMPTY STOMACH 100 Tablet 3    pravastatin (PRAVACHOL) 40 MG tablet Take 1 tablet by mouth every evening. 100 Tablet 1    irbesartan (AVAPRO) 75 MG tablet Take 1 tablet by mouth every day. 100 Tablet 1    amoxicillin (AMOXIL) 500 MG Cap Take 1 Capsule by mouth 4 times a day. 28 Capsule 0    Probiotic Product (PROBIOTIC DAILY PO) Take 1 Capsule by mouth every day.      Multiple Vitamin (MULTIVITAMIN ADULT PO) Take 1 Tablet by mouth every day.      ibuprofen (MOTRIN) 200 MG Tab Take 200 mg by mouth every 6 hours as needed.      acetaminophen (TYLENOL) 325 MG Tab Take 650 mg by mouth every 6 hours as needed. 30 tablet 0           ALLERGIES  Allergies   Allergen Reactions    Tequin [Gatifloxacin] Anaphylaxis, Hives and Swelling       PHYSICAL EXAM  VITAL SIGNS:BP (!) 162/70   Pulse 73   Temp 36.4 °C (97.6 °F) (Temporal)   Resp 19   Ht 1.575 m (5' 2\")   Wt 76.3 kg (168 lb 3.4 oz)   SpO2 92%   BMI 30.77 kg/m²       VITALS - vital signs documented prior to this note have been reviewed and noted,  GENERAL - awake, alert, oriented, GCS 15, no apparent distress, non-toxic  appearing  HEENT - normocephalic, atraumatic, pupils equal, sclera anicteric, blood in bilateral naris appears to have an acute anterior epistaxis of the left naris  NECK - supple, no meningismus, full active range of motion, trachea midline  CARDIOVASCULAR - regular rate/rhythm, no murmurs/gallops/rubs  PULMONARY - no respiratory distress, speaking in full sentences, clear to  auscultation bilaterally, no wheezing/ronchi/rales, no accessory muscle use  GASTROINTESTINAL - soft, non-tender, non-distended, no rebound, guarding,  or peritonitis  GENITOURINARY - Deferred  NEUROLOGIC - Awake alert, normal mental status, speech fluid, cognition  normal, moves all " extremities  MUSCULOSKELETAL - no obvious asymmetry or deformities present  EXTREMITIES - warm, well-perfused, no cyanosis or significant edema  DERMATOLOGIC - warm, dry, no rashes, no jaundice  PSYCHIATRIC - normal affect, normal insight, normal concentration    DIAGNOSTIC STUDIES / PROCEDURES    Radiologist interpretation:   No orders to display        COURSE & MEDICAL DECISION MAKING    ED COURSE:    ED Observation Status?     INTERVENTIONS BY ME:  Medications   oxymetazoline (Afrin) 0.05 % nasal spray 2 Spray (2 Sprays Nasal Given by Provider 12/27/23 0339)   tranexamic acid (CYKLOKAPRON) 1000 MG/10ML for nasal packing 300 mg (300 mg Nasal Given by Provider 12/27/23 0339)             @HTN/IDDM FOLLOW UP:  The patient has known hypertension and is being followed by their primary care doctor@    INITIAL ASSESSMENT, COURSE AND PLAN  Care Narrative: Patient presented for evaluation of a nosebleed.  On examination the patient does appear to have an acute anterior epistaxis of her left naris.  Afrin and TXA were atomized into the left naris and a nose clamp was applied, after which the bleeding was well-controlled.  She is not on any blood thinners or antiplatelets no dizziness lightheadedness or syncope, thus I did defer labs.  History and physical exam seems consistent anterior epistaxis, was instructed on symptomatic care and return precautions.  Discharged in stable condition             ADDITIONAL PROBLEM LIST  Elevated blood pressure reading today has a known history of hypertension  DISPOSITION AND DISCUSSIONS      Escalation of care considered, and ultimately not performed:blood analysis    Decision tools and prescription drugs considered including, but not limited to:  Afrin .    FINAL DIAGNOSIS  1. Epistaxis    #2 elevated blood pressure         Electronically signed by: Satya Earl DO ,4:40 AM 12/27/23

## 2023-12-27 NOTE — ED NOTES
Discharge education provided. Patient verbalizes understanding. Patient A/0x4 and ambulatory to the lobby with a steady gait. Patient discharged home to self care.      yes

## 2023-12-27 NOTE — ED TRIAGE NOTES
Chief Complaint   Patient presents with    Epistaxis     X1 week pt has had consistent nosebleeds, pt states the worst one was tonight and that it started around 2100 and has not stopped, - blood thinners     hx of nosebleeds and has required interventions in the past     Pt ambulatory to triage for above complaint, A+O x 4, GCS 15, NAD    Pt provided napkins and nose clipper, placed in waiting room, apologized for wait times

## 2023-12-27 NOTE — TELEPHONE ENCOUNTER
1. Caller Name: Yuki Caraballo                         Call Back Number: [phone]       How would the patient prefer to be contacted with a response: [employer] message    2. SPECIFIC Action To Be Taken: Orders pending, please sign.    3. Diagnosis/Clinical Reason for Request: bloody noses    4. Specialty & Provider Name/Lab/Imaging Location: ENT (INSURANCE APPROVED )    5. Is appointment scheduled for requested order/referral: no    Patient was not informed they will receive a return phone call from the office ONLY if there are any questions before processing their request. Advised to call back if they haven't received a call from the referral department in 5 days.

## 2024-01-10 ENCOUNTER — PHARMACY VISIT (OUTPATIENT)
Dept: PHARMACY | Facility: MEDICAL CENTER | Age: 74
End: 2024-01-10
Payer: COMMERCIAL

## 2024-01-10 ENCOUNTER — HOSPITAL ENCOUNTER (EMERGENCY)
Facility: MEDICAL CENTER | Age: 74
End: 2024-01-10
Attending: EMERGENCY MEDICINE
Payer: MEDICARE

## 2024-01-10 VITALS
SYSTOLIC BLOOD PRESSURE: 166 MMHG | RESPIRATION RATE: 19 BRPM | OXYGEN SATURATION: 94 % | DIASTOLIC BLOOD PRESSURE: 75 MMHG | TEMPERATURE: 97 F | HEART RATE: 77 BPM | WEIGHT: 166.89 LBS | BODY MASS INDEX: 30.71 KG/M2 | HEIGHT: 62 IN

## 2024-01-10 DIAGNOSIS — R04.0 EPISTAXIS: ICD-10-CM

## 2024-01-10 PROCEDURE — 303620 HCHG EPISTAXIS CONTROL

## 2024-01-10 PROCEDURE — 700101 HCHG RX REV CODE 250: Performed by: EMERGENCY MEDICINE

## 2024-01-10 PROCEDURE — 700102 HCHG RX REV CODE 250 W/ 637 OVERRIDE(OP): Performed by: EMERGENCY MEDICINE

## 2024-01-10 PROCEDURE — RXMED WILLOW AMBULATORY MEDICATION CHARGE: Performed by: EMERGENCY MEDICINE

## 2024-01-10 PROCEDURE — 99284 EMERGENCY DEPT VISIT MOD MDM: CPT

## 2024-01-10 PROCEDURE — A9270 NON-COVERED ITEM OR SERVICE: HCPCS | Performed by: EMERGENCY MEDICINE

## 2024-01-10 RX ORDER — CEPHALEXIN 500 MG/1
500 CAPSULE ORAL 4 TIMES DAILY
Qty: 28 CAPSULE | Refills: 0 | Status: ACTIVE | OUTPATIENT
Start: 2024-01-10 | End: 2024-01-17

## 2024-01-10 RX ORDER — LIDOCAINE HYDROCHLORIDE 20 MG/ML
JELLY TOPICAL
Status: COMPLETED | OUTPATIENT
Start: 2024-01-10 | End: 2024-01-10

## 2024-01-10 RX ORDER — OXYMETAZOLINE HYDROCHLORIDE 0.05 G/100ML
2 SPRAY NASAL ONCE
Status: COMPLETED | OUTPATIENT
Start: 2024-01-10 | End: 2024-01-10

## 2024-01-10 RX ADMIN — LIDOCAINE HYDROCHLORIDE 3 ML: 20 JELLY TOPICAL at 15:08

## 2024-01-10 RX ADMIN — OXYMETAZOLINE HCL 2 SPRAY: 0.05 SPRAY NASAL at 15:10

## 2024-01-10 ASSESSMENT — PAIN DESCRIPTION - PAIN TYPE: TYPE: ACUTE PAIN

## 2024-01-10 ASSESSMENT — FIBROSIS 4 INDEX: FIB4 SCORE: 1.039862913509628712

## 2024-01-10 NOTE — ED NOTES
Patient ambulated to [facility] without incident. Primary assessment complete. Patient changed into gown. Chart up for ERP.

## 2024-01-10 NOTE — ED TRIAGE NOTES
"Chief Complaint   Patient presents with    Epistaxis     Since 0915 this AM. -thinners         72 yo F to triage for above complaint. Pt reports she was seen recently for same and prescribed Afrin that she used this morning with no change. Has been unable to follow up with ENT. Pt denies lightlessness or dizziness at this time. Nose clamp placed at home by pt, small amount of bleeding around clamp.      Pt placed in lobby. Pt educated on triage process. Pt encouraged to alert staff for any changes.     Patient and staff wearing appropriate PPE    BP (!) 192/116   Pulse (!) 115   Temp 35.8 °C (96.5 °F) (Temporal)   Resp 16   Ht 1.575 m (5' 2\")   Wt 75.7 kg (166 lb 14.2 oz)   SpO2 92%   BMI 30.52 kg/m²     "

## 2024-01-10 NOTE — ED PROVIDER NOTES
ED Provider Note    CHIEF COMPLAINT  Chief Complaint   Patient presents with    Epistaxis     Since 0915 this AM. -thinners         [facility]    Yuki Caraballo is a 73 y.o. female who presents with epistaxis.  The patient's been having recurrent epistaxis for quite some time.  She states that she did have a procedure via ENT in the past for epistaxis.  She is been utilizing Afrin for bleeding without success.  She does not take any anticoagulants.  She does not have any associated dizziness nor lightheadedness.    PAST MEDICAL HISTORY   has a past medical history of Ankle pain, Arthritis, Cancer (HCC), Closed fracture of left ankle with routine healing (9/7/2022), Disorder of thyroid, Heart burn, Hemorrhagic disorder (HCC), Hypertension, Other proteinuria (1/25/2022), Situational depression (9/14/2023), and Vitamin D deficiency (1/25/2022).    SURGICAL HISTORY   has a past surgical history that includes gyn surgery (2003-04); gyn surgery (1975); other (1985); other (2012); septoplasty (N/A, 6/2/2017); turbinate reduction (Bilateral, 6/2/2017); orif, ankle (Left, 8/17/2021); removal deep implant (Left, 6/30/2022); and repair flex leg tendon,prim,ea (Left, 6/30/2022).    FAMILY HISTORY  No family history on file.    SOCIAL HISTORY  Social History     Tobacco Use    Smoking status: Never    Smokeless tobacco: Current     Types: Chew   Vaping Use    Vaping Use: Never used   Substance and Sexual Activity    Alcohol use: Not Currently     Alcohol/week: 4.2 oz     Types: 7 Standard drinks or equivalent per week    Drug use: No    Sexual activity: Not on file       CURRENT MEDICATIONS  Home Medications       Reviewed by Leda Feng R.N. (Registered Nurse) on 01/10/24 at 1225  Med List Status: Not Addressed     Medication Last Dose Status   acetaminophen (TYLENOL) 325 MG Tab  Active   amoxicillin (AMOXIL) 500 MG Cap  Active   DULoxetine (CYMBALTA) 20 MG Cap DR Particles  Active   ibuprofen (MOTRIN) 200 MG Tab  Active  "  irbesartan (AVAPRO) 75 MG tablet  Active   Multiple Vitamin (MULTIVITAMIN ADULT PO)  Active   pravastatin (PRAVACHOL) 40 MG tablet  Active   Probiotic Product (PROBIOTIC DAILY PO)  Active   SYNTHROID 88 MCG Tab  Active                    ALLERGIES  Allergies   Allergen Reactions    Tequin [Gatifloxacin] Anaphylaxis, Hives and Swelling       PHYSICAL EXAM  VITAL SIGNS: BP (!) 192/116   Pulse (!) 115   Temp 35.8 °C (96.5 °F) (Temporal)   Resp 16   Ht 1.575 m (5' 2\")   Wt 75.7 kg (166 lb 14.2 oz)   SpO2 92%   BMI 30.52 kg/m²    In general the patient does not appear toxic    Nares the patient does have some active bleeding in the left nostril, oropharynx mild blood in the oropharynx    Skin no pallor    Pulmonary lungs are clear to auscultation bilaterally    Cardiovascular S1-S2 with a tachycardic rate    PROCEDURES catheter placement left nostril  A rapid Rhino was placed in the left nostril insufflated with approximately 5 cc of air.    COURSE & MEDICAL DECISION MAKING    This is 73-year-old female who presents to the emergency department with recurrent epistaxis.  I did attempt to stop the bleeding with a cottonball instilled with lidocaine and Afrin and this was not effective.  After approximate 30 minutes of pressure the cottonball was removed and the patient still had active bleeding that appears to be coming from the posterior aspect of the nose.  She continues have some oropharyngeal bleeding as well but it is very mild.  Therefore I placed a rapid Rhino and insufflated the rocket and achieved tamponade.  The patient will be discharged home on Keflex with instructions to follow-up with ENT in 5 to 7 days.  The patient's blood pressure has come down throughout her stay here in the emergency department.  FINAL DIAGNOSIS  1.  Left epistaxis    Disposition  The patient will be discharged in stable condition       Electronically signed by: Geo Abdul M.D., 1/10/2024 3:09 PM      "

## 2024-01-11 NOTE — ED NOTES
Received orders for DC. VSS. Educated regarding f/u with ENT and to call tomorrow for appointment in 5-7 days. Able to dress self. Ambualtory with a steady gait.

## 2024-01-25 DIAGNOSIS — E03.9 ACQUIRED HYPOTHYROIDISM: ICD-10-CM

## 2024-01-25 DIAGNOSIS — E55.9 VITAMIN D DEFICIENCY: ICD-10-CM

## 2024-01-25 DIAGNOSIS — E78.5 DYSLIPIDEMIA: ICD-10-CM

## 2024-02-06 ENCOUNTER — HOSPITAL ENCOUNTER (OUTPATIENT)
Dept: LAB | Facility: MEDICAL CENTER | Age: 74
End: 2024-02-06
Attending: INTERNAL MEDICINE
Payer: MEDICARE

## 2024-02-06 DIAGNOSIS — E03.9 ACQUIRED HYPOTHYROIDISM: ICD-10-CM

## 2024-02-06 DIAGNOSIS — E55.9 VITAMIN D DEFICIENCY: ICD-10-CM

## 2024-02-06 DIAGNOSIS — E78.5 DYSLIPIDEMIA: ICD-10-CM

## 2024-02-06 LAB
25(OH)D3 SERPL-MCNC: 58 NG/ML (ref 30–100)
ALBUMIN SERPL BCP-MCNC: 4.1 G/DL (ref 3.2–4.9)
ALBUMIN/GLOB SERPL: 1.1 G/DL
ALP SERPL-CCNC: 66 U/L (ref 30–99)
ALT SERPL-CCNC: 20 U/L (ref 2–50)
ANION GAP SERPL CALC-SCNC: 11 MMOL/L (ref 7–16)
AST SERPL-CCNC: 15 U/L (ref 12–45)
BASOPHILS # BLD AUTO: 0.8 % (ref 0–1.8)
BASOPHILS # BLD: 0.05 K/UL (ref 0–0.12)
BILIRUB SERPL-MCNC: 0.5 MG/DL (ref 0.1–1.5)
BUN SERPL-MCNC: 14 MG/DL (ref 8–22)
CALCIUM ALBUM COR SERPL-MCNC: 9.8 MG/DL (ref 8.5–10.5)
CALCIUM SERPL-MCNC: 9.9 MG/DL (ref 8.5–10.5)
CHLORIDE SERPL-SCNC: 104 MMOL/L (ref 96–112)
CHOLEST SERPL-MCNC: 206 MG/DL (ref 100–199)
CO2 SERPL-SCNC: 24 MMOL/L (ref 20–33)
CREAT SERPL-MCNC: 0.85 MG/DL (ref 0.5–1.4)
EOSINOPHIL # BLD AUTO: 0.06 K/UL (ref 0–0.51)
EOSINOPHIL NFR BLD: 1 % (ref 0–6.9)
ERYTHROCYTE [DISTWIDTH] IN BLOOD BY AUTOMATED COUNT: 44.5 FL (ref 35.9–50)
GFR SERPLBLD CREATININE-BSD FMLA CKD-EPI: 71 ML/MIN/1.73 M 2
GLOBULIN SER CALC-MCNC: 3.9 G/DL (ref 1.9–3.5)
GLUCOSE SERPL-MCNC: 97 MG/DL (ref 65–99)
HCT VFR BLD AUTO: 41.5 % (ref 37–47)
HDLC SERPL-MCNC: 59 MG/DL
HGB BLD-MCNC: 14.5 G/DL (ref 12–16)
IMM GRANULOCYTES # BLD AUTO: 0.02 K/UL (ref 0–0.11)
IMM GRANULOCYTES NFR BLD AUTO: 0.3 % (ref 0–0.9)
LDLC SERPL CALC-MCNC: 116 MG/DL
LYMPHOCYTES # BLD AUTO: 1.71 K/UL (ref 1–4.8)
LYMPHOCYTES NFR BLD: 27.8 % (ref 22–41)
MCH RBC QN AUTO: 32.2 PG (ref 27–33)
MCHC RBC AUTO-ENTMCNC: 34.9 G/DL (ref 32.2–35.5)
MCV RBC AUTO: 92 FL (ref 81.4–97.8)
MONOCYTES # BLD AUTO: 0.56 K/UL (ref 0–0.85)
MONOCYTES NFR BLD AUTO: 9.1 % (ref 0–13.4)
NEUTROPHILS # BLD AUTO: 3.76 K/UL (ref 1.82–7.42)
NEUTROPHILS NFR BLD: 61 % (ref 44–72)
NRBC # BLD AUTO: 0 K/UL
NRBC BLD-RTO: 0 /100 WBC (ref 0–0.2)
PLATELET # BLD AUTO: 293 K/UL (ref 164–446)
PMV BLD AUTO: 9.8 FL (ref 9–12.9)
POTASSIUM SERPL-SCNC: 4.1 MMOL/L (ref 3.6–5.5)
PROT SERPL-MCNC: 8 G/DL (ref 6–8.2)
RBC # BLD AUTO: 4.51 M/UL (ref 4.2–5.4)
SODIUM SERPL-SCNC: 139 MMOL/L (ref 135–145)
T4 FREE SERPL-MCNC: 1.86 NG/DL (ref 0.93–1.7)
TRIGL SERPL-MCNC: 154 MG/DL (ref 0–149)
TSH SERPL DL<=0.005 MIU/L-ACNC: 0.22 UIU/ML (ref 0.38–5.33)
WBC # BLD AUTO: 6.2 K/UL (ref 4.8–10.8)

## 2024-02-06 PROCEDURE — 84443 ASSAY THYROID STIM HORMONE: CPT

## 2024-02-06 PROCEDURE — 80061 LIPID PANEL: CPT

## 2024-02-06 PROCEDURE — 80053 COMPREHEN METABOLIC PANEL: CPT

## 2024-02-06 PROCEDURE — 36415 COLL VENOUS BLD VENIPUNCTURE: CPT

## 2024-02-06 PROCEDURE — 84439 ASSAY OF FREE THYROXINE: CPT

## 2024-02-06 PROCEDURE — 82306 VITAMIN D 25 HYDROXY: CPT

## 2024-02-06 PROCEDURE — 85025 COMPLETE CBC W/AUTO DIFF WBC: CPT

## 2024-02-06 RX ORDER — IRBESARTAN 75 MG/1
75 TABLET ORAL DAILY
Qty: 100 TABLET | Refills: 1 | Status: SHIPPED | OUTPATIENT
Start: 2024-02-06

## 2024-02-13 ENCOUNTER — TELEPHONE (OUTPATIENT)
Dept: MEDICAL GROUP | Facility: PHYSICIAN GROUP | Age: 74
End: 2024-02-13
Payer: MEDICARE

## 2024-02-13 NOTE — TELEPHONE ENCOUNTER
Caller Name: Yuki Caraballo  Call Back Number: [phone] (home)      How would the patient prefer to be contacted with a response: Phone call OK to leave a detailed message    Patient needs a refill on Irbesartan for her blood pressure. Patient has denied the Rx due to insurance plan. Insurance plan does not cover prescriptions. Patient states she is willing to pay out of pocket for medication and really needs a fill.

## 2024-02-15 PROCEDURE — RXMED WILLOW AMBULATORY MEDICATION CHARGE: Performed by: INTERNAL MEDICINE

## 2024-02-15 RX ORDER — PRAVASTATIN SODIUM 40 MG
40 TABLET ORAL NIGHTLY
Qty: 100 TABLET | Refills: 3 | Status: SHIPPED | OUTPATIENT
Start: 2024-02-15

## 2024-02-15 NOTE — TELEPHONE ENCOUNTER
Received request via: Pharmacy    Was the patient seen in the last year in this department? Yes    Does the patient have an active prescription (recently filled or refills available) for medication(s) requested? No    Pharmacy Name: [facility] [place]    Does the patient have [facility] and need 100 day supply (blood pressure, diabetes and cholesterol meds only)? Yes, quantity updated to 100 days

## 2024-02-20 ENCOUNTER — PHARMACY VISIT (OUTPATIENT)
Dept: PHARMACY | Facility: MEDICAL CENTER | Age: 74
End: 2024-02-20
Payer: COMMERCIAL

## 2024-02-21 NOTE — TELEPHONE ENCOUNTER
Rx was sent 2/6, does she need a hard copy prescription to take to a pharmacy to fill or has this been handled?

## 2024-03-01 ENCOUNTER — PHARMACY VISIT (OUTPATIENT)
Dept: PHARMACY | Facility: MEDICAL CENTER | Age: 74
End: 2024-03-01
Payer: COMMERCIAL

## 2024-03-01 PROCEDURE — RXMED WILLOW AMBULATORY MEDICATION CHARGE: Performed by: INTERNAL MEDICINE

## 2024-03-07 ENCOUNTER — APPOINTMENT (OUTPATIENT)
Dept: MEDICAL GROUP | Facility: PHYSICIAN GROUP | Age: 74
End: 2024-03-07
Payer: MEDICARE

## 2024-03-12 ENCOUNTER — TELEPHONE (OUTPATIENT)
Dept: MEDICAL GROUP | Facility: PHYSICIAN GROUP | Age: 74
End: 2024-03-12
Payer: MEDICARE

## 2024-03-19 DIAGNOSIS — Z12.83 SKIN CANCER SCREENING: ICD-10-CM

## 2024-04-18 ENCOUNTER — OFFICE VISIT (OUTPATIENT)
Dept: DERMATOLOGY | Facility: IMAGING CENTER | Age: 74
End: 2024-04-18
Payer: MEDICARE

## 2024-04-18 DIAGNOSIS — L82.1 SK (SEBORRHEIC KERATOSIS): ICD-10-CM

## 2024-04-18 DIAGNOSIS — L73.8 SEBACEOUS HYPERPLASIA: ICD-10-CM

## 2024-04-18 DIAGNOSIS — Z12.83 SKIN CANCER SCREENING: ICD-10-CM

## 2024-04-18 DIAGNOSIS — L81.4 LENTIGINES: ICD-10-CM

## 2024-04-18 DIAGNOSIS — D22.9 MULTIPLE NEVI: ICD-10-CM

## 2024-04-18 DIAGNOSIS — D18.01 CHERRY ANGIOMA: ICD-10-CM

## 2024-04-18 PROCEDURE — 99213 OFFICE O/P EST LOW 20 MIN: CPT | Performed by: NURSE PRACTITIONER

## 2024-04-18 NOTE — PROGRESS NOTES
DERMATOLOGY NOTE  NEW VISIT       Chief complaint: Establish Care (ALYSSA)     Few spots of concern on back and arms      History of skin cancer: No  History of precancers/actinic keratoses: No  History of biopsies:Yes, Details: Benign  History of blistering/severe sunburns:Yes, Details:    Family history of skin cancer:Yes, Details: Unsure if father  Family history of atypical moles:Yes, Details: daughter, son      Allergies   Allergen Reactions    Tequin [Gatifloxacin] Anaphylaxis, Hives and Swelling        MEDICATIONS:  Medications relevant to specialty reviewed.     REVIEW OF SYSTEMS:   Positive for skin (see HPI)  Negative for fevers and chills       EXAM:  There were no vitals taken for this visit.  Constitutional: Well-developed, well-nourished, and in no distress.     A total body skin exam was performed excluding the genitals per patient preference and including the following areas: head (including face), neck, chest, abdomen, groin/buttocks, back, bilateral upper extremities, and bilateral lower extremities with the following pertinent findings listed below and/or in assessment/plan.     -sun exposed skin of trunk and b/l upper, lower extremities and face with scattered clinically benign light brown reticulated macules all of which were morphologically similar and none of which were suspicious for skin cancer today on exam    -Several scattered 1-3mm bright red macules and thin papules on the trunk, scalp, face and extremities    -Multiple tan medium brown skin-colored macules papules scattered over the trunk >> extremities--All with benign-appearing pigment network patterns on dermoscopy    -Several tan medium brown stuck-on waxy papules scattered on the face, trunk, and extremities     -Few scattered yellowish/red papules with telangiectasias and central dell over face      IMPRESSION / PLAN:    1. Lentigines  - Benign-appearing nature of lesions discussed during exam.   - Advised to continue to monitor for  any return to clinic for new or concerning changes.      2. Cherry angioma  - Benign-appearing nature of lesions discussed during exam.   - Advised to continue to monitor for any return to clinic for new or concerning changes.      3. Multiple nevi  - Benign-appearing nature of lesions discussed during exam.   - Advised to continue to monitor for any return to clinic for new or concerning changes.  - ABCDE's of melanoma discussed/handout given      4. SK (seborrheic keratosis)  - Benign-appearing nature of lesions discussed during exam.   - Courtesy LN2 applied to 3 SKs on face for cosmesis  - Advised to continue to monitor for any return to clinic for new or concerning changes.      5. Sebaceous hyperplasia  - Benign-appearing nature of lesions discussed during exam.   - Advised to continue to monitor for any return to clinic for new or concerning changes.      6. Skin cancer screening  Skin cancer education  discussed importance of sun protective clothing, eyewear in addition to the use of broad spectrum sunscreen with SPF 30 or greater, as well as need for reapplication ~every 2 hours when exposed to UVR/handout given  discussed importance following up for any new or changing lesions as noted in handout given, but every 12 months exams in clinic in the setting of dermatologic history  ABCDE's of melanoma discussed/handout given        Discussed risks associated with LN2, Patient verbalized understanding and agrees with plan regarding the above            Please note that this dictation was created using voice recognition software. I have made every reasonable attempt to correct obvious errors, but I expect that there are errors of grammar and possibly content that I did not discover before finalizing the note.      Return to clinic in: Return in about 1 year (around 4/18/2025) for ALYSSA. and as needed for any new or changing skin lesions.

## 2024-05-07 DIAGNOSIS — I10 ESSENTIAL HYPERTENSION: ICD-10-CM

## 2024-05-07 DIAGNOSIS — E78.5 DYSLIPIDEMIA: ICD-10-CM

## 2024-05-07 RX ORDER — LOSARTAN POTASSIUM 25 MG/1
25 TABLET ORAL DAILY
Qty: 100 TABLET | Refills: 1 | Status: SHIPPED | OUTPATIENT
Start: 2024-05-07

## 2024-05-07 RX ORDER — SIMVASTATIN 40 MG
40 TABLET ORAL NIGHTLY
Qty: 100 TABLET | Refills: 1 | Status: SHIPPED | OUTPATIENT
Start: 2024-05-07

## 2024-05-15 PROCEDURE — RXMED WILLOW AMBULATORY MEDICATION CHARGE: Performed by: INTERNAL MEDICINE

## 2024-05-21 ENCOUNTER — PHARMACY VISIT (OUTPATIENT)
Dept: PHARMACY | Facility: MEDICAL CENTER | Age: 74
End: 2024-05-21
Payer: COMMERCIAL

## 2024-05-24 ENCOUNTER — HOSPITAL ENCOUNTER (OUTPATIENT)
Dept: LAB | Facility: MEDICAL CENTER | Age: 74
End: 2024-05-24
Attending: INTERNAL MEDICINE
Payer: MEDICARE

## 2024-05-24 DIAGNOSIS — E78.5 DYSLIPIDEMIA: ICD-10-CM

## 2024-05-24 LAB
CHOLEST SERPL-MCNC: 203 MG/DL (ref 100–199)
HDLC SERPL-MCNC: 51 MG/DL
LDLC SERPL CALC-MCNC: 123 MG/DL
TRIGL SERPL-MCNC: 146 MG/DL (ref 0–149)

## 2024-08-15 ENCOUNTER — HOSPITAL ENCOUNTER (OUTPATIENT)
Dept: RADIOLOGY | Facility: MEDICAL CENTER | Age: 74
End: 2024-08-15
Attending: INTERNAL MEDICINE
Payer: MEDICARE

## 2024-08-15 DIAGNOSIS — Z12.31 VISIT FOR SCREENING MAMMOGRAM: ICD-10-CM

## 2024-08-15 PROCEDURE — 77067 SCR MAMMO BI INCL CAD: CPT

## 2024-08-30 PROCEDURE — RXMED WILLOW AMBULATORY MEDICATION CHARGE: Performed by: INTERNAL MEDICINE

## 2024-09-03 ENCOUNTER — PHARMACY VISIT (OUTPATIENT)
Dept: PHARMACY | Facility: MEDICAL CENTER | Age: 74
End: 2024-09-03
Payer: COMMERCIAL

## 2024-10-15 ENCOUNTER — OFFICE VISIT (OUTPATIENT)
Dept: MEDICAL GROUP | Facility: PHYSICIAN GROUP | Age: 74
End: 2024-10-15
Payer: MEDICARE

## 2024-10-15 VITALS
TEMPERATURE: 98.5 F | HEIGHT: 62 IN | DIASTOLIC BLOOD PRESSURE: 84 MMHG | WEIGHT: 173.2 LBS | OXYGEN SATURATION: 96 % | HEART RATE: 82 BPM | SYSTOLIC BLOOD PRESSURE: 160 MMHG | BODY MASS INDEX: 31.87 KG/M2

## 2024-10-15 DIAGNOSIS — R09.81 CHRONIC NASAL CONGESTION: ICD-10-CM

## 2024-10-15 DIAGNOSIS — I10 ESSENTIAL HYPERTENSION: ICD-10-CM

## 2024-10-15 PROCEDURE — 99214 OFFICE O/P EST MOD 30 MIN: CPT | Performed by: NURSE PRACTITIONER

## 2024-10-15 PROCEDURE — 3079F DIAST BP 80-89 MM HG: CPT | Performed by: NURSE PRACTITIONER

## 2024-10-15 PROCEDURE — 3077F SYST BP >= 140 MM HG: CPT | Performed by: NURSE PRACTITIONER

## 2024-10-15 RX ORDER — OLMESARTAN MEDOXOMIL 40 MG/1
40 TABLET ORAL DAILY
Qty: 100 TABLET | Refills: 0 | Status: SHIPPED | OUTPATIENT
Start: 2024-10-15

## 2024-10-15 ASSESSMENT — PATIENT HEALTH QUESTIONNAIRE - PHQ9: CLINICAL INTERPRETATION OF PHQ2 SCORE: 0

## 2024-10-15 ASSESSMENT — FIBROSIS 4 INDEX: FIB4 SCORE: 0.87

## 2024-10-17 PROCEDURE — RXMED WILLOW AMBULATORY MEDICATION CHARGE: Performed by: NURSE PRACTITIONER

## 2024-10-18 ENCOUNTER — PHARMACY VISIT (OUTPATIENT)
Dept: PHARMACY | Facility: MEDICAL CENTER | Age: 74
End: 2024-10-18
Payer: COMMERCIAL

## 2024-11-19 ENCOUNTER — OFFICE VISIT (OUTPATIENT)
Dept: MEDICAL GROUP | Facility: PHYSICIAN GROUP | Age: 74
End: 2024-11-19
Payer: MEDICARE

## 2024-11-19 VITALS
HEIGHT: 62 IN | OXYGEN SATURATION: 94 % | DIASTOLIC BLOOD PRESSURE: 78 MMHG | HEART RATE: 101 BPM | TEMPERATURE: 99.2 F | WEIGHT: 172 LBS | SYSTOLIC BLOOD PRESSURE: 132 MMHG | BODY MASS INDEX: 31.65 KG/M2

## 2024-11-19 DIAGNOSIS — R09.82 POST-NASAL DRAINAGE: ICD-10-CM

## 2024-11-19 DIAGNOSIS — I10 ESSENTIAL HYPERTENSION: ICD-10-CM

## 2024-11-19 DIAGNOSIS — E78.2 MIXED HYPERLIPIDEMIA: ICD-10-CM

## 2024-11-19 PROCEDURE — 99214 OFFICE O/P EST MOD 30 MIN: CPT | Performed by: NURSE PRACTITIONER

## 2024-11-19 PROCEDURE — 3075F SYST BP GE 130 - 139MM HG: CPT | Performed by: NURSE PRACTITIONER

## 2024-11-19 PROCEDURE — 3078F DIAST BP <80 MM HG: CPT | Performed by: NURSE PRACTITIONER

## 2024-11-19 ASSESSMENT — FIBROSIS 4 INDEX: FIB4 SCORE: 0.87

## 2024-11-19 NOTE — PROGRESS NOTES
"Verbal consent was acquired by the patient to use Signal ambient listening note generation during this visit yes    Subjective:     HPI:   History of Present Illness  The patient presents for evaluation of hypertension.    She reports an improvement in her blood pressure, with the change in medication to the olmesartan 40 mg. She has been adhering to her medication regimen without experiencing any side effects. She believes that her blood pressure readings are higher when taken with an electronic device.    She broke her ankle 3 years ago, and it has never fully recovered. She is much more sedentary than she used to be and has to force herself to do things because it hurts. She has not found anything specific that alleviates the pain in her ankle. She can only go a short distance before needing to return or be picked up by someone. She has undergone some physical therapy for her ankle.    She also has a congestion/drainage issue and has an appointment with her ENT in 01/2025.     Per patient thyroid issues are chronic in nature and stable, she continues Synthroid 88 mcg by mouth on an empty stomach    Health Maintenance: needs AWV    Objective:     Exam:  /78 (BP Location: Left arm, Patient Position: Sitting)   Pulse (!) 101   Temp 37.3 °C (99.2 °F) (Temporal)   Ht 1.575 m (5' 2\")   Wt 78 kg (172 lb)   SpO2 94%   BMI 31.46 kg/m²  Body mass index is 31.46 kg/m².    Constitutional: Alert, no distress, well-groomed.  Skin: Warm, dry, good turgor, no rashes in visible areas.  Eye: Equal, round and reactive, conjunctiva clear, lids normal.  ENMT: Lips without lesions, good dentition, moist mucous membranes.  Neck: Trachea midline, no masses, no thyromegaly.  Respiratory: Unlabored respiratory effort, no cough.  MSK: Normal gait, moves all extremities.  Neuro: Grossly non-focal.   Psych: Alert and oriented x3, normal affect and mood.    Results      Assessment & Plan:     1. Essential hypertension      "   2. Mixed hyperlipidemia        3. Post-nasal drainage        4. BMI 31.0-31.9,adult  Patient identified as having weight management issue.  Appropriate orders and counseling given.          Assessment & Plan  1. Hypertension.  Her blood pressure readings are slightly elevated, ranging between 160 and 140. She is currently on olmesartan 40 mg, which has shown improvement in her blood pressure readings to 132/76. She is advised to continue with the current dosage of olmesartan 40 mg. She is instructed to monitor her blood pressure at home, ensuring her feet are flat on the floor during measurements. The prescription will be sent to the pharmacy on [address].    2. Chronic ankle pain.  She reports persistent pain in her ankle since breaking it three years ago, which has limited her mobility and activity levels. She has previously undergone physical therapy without significant improvement. She is encouraged to consider short walks or using pedals to increase activity levels as tolerated.    3.  Congestion  She has an appointment scheduled with her ENT in January to address the issue of excess mucus production.    4.  Hypothyroidism   Continue Synthroid 88 mcg by mouth on an empty stomach    Follow-up  Patient is scheduled for a follow-up visit on 12/19/2024.    Return in about 1 month (around 12/19/2024).    I have placed the below orders and discussed them with an approved delegating provider. The MA is performing the below orders under the direction of Dr. Martinez.      Please note that this dictation was created using voice recognition software. I have made every reasonable attempt to correct obvious errors, but I expect that there are errors of grammar and possibly content that I did not discover before finalizing the note.

## 2024-12-16 SDOH — ECONOMIC STABILITY: INCOME INSECURITY: IN THE LAST 12 MONTHS, WAS THERE A TIME WHEN YOU WERE NOT ABLE TO PAY THE MORTGAGE OR RENT ON TIME?: NO

## 2024-12-16 SDOH — ECONOMIC STABILITY: TRANSPORTATION INSECURITY
IN THE PAST 12 MONTHS, HAS THE LACK OF TRANSPORTATION KEPT YOU FROM MEDICAL APPOINTMENTS OR FROM GETTING MEDICATIONS?: NO

## 2024-12-16 SDOH — ECONOMIC STABILITY: FOOD INSECURITY: WITHIN THE PAST 12 MONTHS, YOU WORRIED THAT YOUR FOOD WOULD RUN OUT BEFORE YOU GOT MONEY TO BUY MORE.: NEVER TRUE

## 2024-12-16 SDOH — ECONOMIC STABILITY: INCOME INSECURITY: HOW HARD IS IT FOR YOU TO PAY FOR THE VERY BASICS LIKE FOOD, HOUSING, MEDICAL CARE, AND HEATING?: NOT HARD AT ALL

## 2024-12-16 SDOH — ECONOMIC STABILITY: FOOD INSECURITY: WITHIN THE PAST 12 MONTHS, THE FOOD YOU BOUGHT JUST DIDN'T LAST AND YOU DIDN'T HAVE MONEY TO GET MORE.: NEVER TRUE

## 2024-12-16 SDOH — HEALTH STABILITY: PHYSICAL HEALTH: ON AVERAGE, HOW MANY DAYS PER WEEK DO YOU ENGAGE IN MODERATE TO STRENUOUS EXERCISE (LIKE A BRISK WALK)?: 1 DAY

## 2024-12-16 SDOH — HEALTH STABILITY: PHYSICAL HEALTH: ON AVERAGE, HOW MANY MINUTES DO YOU ENGAGE IN EXERCISE AT THIS LEVEL?: 10 MIN

## 2024-12-16 SDOH — ECONOMIC STABILITY: TRANSPORTATION INSECURITY
IN THE PAST 12 MONTHS, HAS LACK OF TRANSPORTATION KEPT YOU FROM MEETINGS, WORK, OR FROM GETTING THINGS NEEDED FOR DAILY LIVING?: NO

## 2024-12-16 ASSESSMENT — LIFESTYLE VARIABLES
SKIP TO QUESTIONS 9-10: 1
HOW MANY STANDARD DRINKS CONTAINING ALCOHOL DO YOU HAVE ON A TYPICAL DAY: 1 OR 2
HOW OFTEN DO YOU HAVE A DRINK CONTAINING ALCOHOL: 4 OR MORE TIMES A WEEK
HOW OFTEN DO YOU HAVE SIX OR MORE DRINKS ON ONE OCCASION: NEVER
AUDIT-C TOTAL SCORE: 4

## 2024-12-16 ASSESSMENT — SOCIAL DETERMINANTS OF HEALTH (SDOH)
IN A TYPICAL WEEK, HOW MANY TIMES DO YOU TALK ON THE PHONE WITH FAMILY, FRIENDS, OR NEIGHBORS?: MORE THAN THREE TIMES A WEEK
HOW OFTEN DO YOU ATTENT MEETINGS OF THE CLUB OR ORGANIZATION YOU BELONG TO?: NEVER
HOW OFTEN DO YOU GET TOGETHER WITH FRIENDS OR RELATIVES?: TWICE A WEEK
HOW OFTEN DO YOU ATTEND CHURCH OR RELIGIOUS SERVICES?: NEVER
IN THE PAST 12 MONTHS, HAS THE ELECTRIC, GAS, OIL, OR WATER COMPANY THREATENED TO SHUT OFF SERVICE IN YOUR HOME?: NO
DO YOU BELONG TO ANY CLUBS OR ORGANIZATIONS SUCH AS CHURCH GROUPS UNIONS, FRATERNAL OR ATHLETIC GROUPS, OR SCHOOL GROUPS?: NO

## 2024-12-28 SDOH — ECONOMIC STABILITY: FOOD INSECURITY: WITHIN THE PAST 12 MONTHS, THE FOOD YOU BOUGHT JUST DIDN'T LAST AND YOU DIDN'T HAVE MONEY TO GET MORE.: NEVER TRUE

## 2024-12-28 SDOH — HEALTH STABILITY: PHYSICAL HEALTH: ON AVERAGE, HOW MANY MINUTES DO YOU ENGAGE IN EXERCISE AT THIS LEVEL?: 10 MIN

## 2024-12-28 SDOH — ECONOMIC STABILITY: INCOME INSECURITY: HOW HARD IS IT FOR YOU TO PAY FOR THE VERY BASICS LIKE FOOD, HOUSING, MEDICAL CARE, AND HEATING?: NOT HARD AT ALL

## 2024-12-28 SDOH — ECONOMIC STABILITY: INCOME INSECURITY: IN THE LAST 12 MONTHS, WAS THERE A TIME WHEN YOU WERE NOT ABLE TO PAY THE MORTGAGE OR RENT ON TIME?: NO

## 2024-12-28 SDOH — HEALTH STABILITY: PHYSICAL HEALTH: ON AVERAGE, HOW MANY DAYS PER WEEK DO YOU ENGAGE IN MODERATE TO STRENUOUS EXERCISE (LIKE A BRISK WALK)?: 1 DAY

## 2024-12-28 SDOH — HEALTH STABILITY: MENTAL HEALTH
STRESS IS WHEN SOMEONE FEELS TENSE, NERVOUS, ANXIOUS, OR CAN'T SLEEP AT NIGHT BECAUSE THEIR MIND IS TROUBLED. HOW STRESSED ARE YOU?: TO SOME EXTENT

## 2024-12-28 SDOH — ECONOMIC STABILITY: TRANSPORTATION INSECURITY
IN THE PAST 12 MONTHS, HAS LACK OF RELIABLE TRANSPORTATION KEPT YOU FROM MEDICAL APPOINTMENTS, MEETINGS, WORK OR FROM GETTING THINGS NEEDED FOR DAILY LIVING?: NO

## 2024-12-28 SDOH — ECONOMIC STABILITY: FOOD INSECURITY: WITHIN THE PAST 12 MONTHS, YOU WORRIED THAT YOUR FOOD WOULD RUN OUT BEFORE YOU GOT MONEY TO BUY MORE.: NEVER TRUE

## 2024-12-28 SDOH — ECONOMIC STABILITY: HOUSING INSECURITY
IN THE LAST 12 MONTHS, WAS THERE A TIME WHEN YOU DID NOT HAVE A STEADY PLACE TO SLEEP OR SLEPT IN A SHELTER (INCLUDING NOW)?: NO

## 2024-12-28 ASSESSMENT — LIFESTYLE VARIABLES
SKIP TO QUESTIONS 9-10: 1
HOW OFTEN DO YOU HAVE A DRINK CONTAINING ALCOHOL: 4 OR MORE TIMES A WEEK
HOW OFTEN DO YOU HAVE SIX OR MORE DRINKS ON ONE OCCASION: NEVER
HOW MANY STANDARD DRINKS CONTAINING ALCOHOL DO YOU HAVE ON A TYPICAL DAY: 1 OR 2
AUDIT-C TOTAL SCORE: 4

## 2024-12-28 ASSESSMENT — SOCIAL DETERMINANTS OF HEALTH (SDOH)
DO YOU BELONG TO ANY CLUBS OR ORGANIZATIONS SUCH AS CHURCH GROUPS UNIONS, FRATERNAL OR ATHLETIC GROUPS, OR SCHOOL GROUPS?: NO
HOW OFTEN DO YOU ATTENT MEETINGS OF THE CLUB OR ORGANIZATION YOU BELONG TO?: NEVER
HOW HARD IS IT FOR YOU TO PAY FOR THE VERY BASICS LIKE FOOD, HOUSING, MEDICAL CARE, AND HEATING?: NOT HARD AT ALL
HOW OFTEN DO YOU ATTEND CHURCH OR RELIGIOUS SERVICES?: NEVER
HOW OFTEN DO YOU HAVE A DRINK CONTAINING ALCOHOL: 4 OR MORE TIMES A WEEK
DO YOU BELONG TO ANY CLUBS OR ORGANIZATIONS SUCH AS CHURCH GROUPS UNIONS, FRATERNAL OR ATHLETIC GROUPS, OR SCHOOL GROUPS?: NO
IN A TYPICAL WEEK, HOW MANY TIMES DO YOU TALK ON THE PHONE WITH FAMILY, FRIENDS, OR NEIGHBORS?: MORE THAN THREE TIMES A WEEK
IN A TYPICAL WEEK, HOW MANY TIMES DO YOU TALK ON THE PHONE WITH FAMILY, FRIENDS, OR NEIGHBORS?: MORE THAN THREE TIMES A WEEK
HOW OFTEN DO YOU HAVE SIX OR MORE DRINKS ON ONE OCCASION: NEVER
WITHIN THE PAST 12 MONTHS, YOU WORRIED THAT YOUR FOOD WOULD RUN OUT BEFORE YOU GOT THE MONEY TO BUY MORE: NEVER TRUE
HOW OFTEN DO YOU ATTEND CHURCH OR RELIGIOUS SERVICES?: NEVER
HOW OFTEN DO YOU GET TOGETHER WITH FRIENDS OR RELATIVES?: TWICE A WEEK
HOW MANY DRINKS CONTAINING ALCOHOL DO YOU HAVE ON A TYPICAL DAY WHEN YOU ARE DRINKING: 1 OR 2
IN THE PAST 12 MONTHS, HAS THE ELECTRIC, GAS, OIL, OR WATER COMPANY THREATENED TO SHUT OFF SERVICE IN YOUR HOME?: NO
HOW OFTEN DO YOU GET TOGETHER WITH FRIENDS OR RELATIVES?: TWICE A WEEK
HOW OFTEN DO YOU ATTENT MEETINGS OF THE CLUB OR ORGANIZATION YOU BELONG TO?: NEVER

## 2024-12-31 ENCOUNTER — OFFICE VISIT (OUTPATIENT)
Dept: MEDICAL GROUP | Facility: PHYSICIAN GROUP | Age: 74
End: 2024-12-31
Payer: MEDICARE

## 2024-12-31 VITALS
OXYGEN SATURATION: 96 % | SYSTOLIC BLOOD PRESSURE: 136 MMHG | DIASTOLIC BLOOD PRESSURE: 74 MMHG | HEIGHT: 62 IN | WEIGHT: 172.8 LBS | BODY MASS INDEX: 31.8 KG/M2 | TEMPERATURE: 98.7 F | HEART RATE: 82 BPM

## 2024-12-31 DIAGNOSIS — I10 ESSENTIAL HYPERTENSION: ICD-10-CM

## 2024-12-31 DIAGNOSIS — E03.9 ACQUIRED HYPOTHYROIDISM: ICD-10-CM

## 2024-12-31 DIAGNOSIS — R80.8 OTHER PROTEINURIA: ICD-10-CM

## 2024-12-31 DIAGNOSIS — E78.2 MIXED HYPERLIPIDEMIA: ICD-10-CM

## 2024-12-31 PROBLEM — F43.21 SITUATIONAL DEPRESSION: Status: RESOLVED | Noted: 2023-09-14 | Resolved: 2024-12-31

## 2024-12-31 RX ORDER — SIMVASTATIN 40 MG
40 TABLET ORAL NIGHTLY
Qty: 100 TABLET | Refills: 1 | Status: SHIPPED | OUTPATIENT
Start: 2024-12-31

## 2024-12-31 RX ORDER — OLMESARTAN MEDOXOMIL 40 MG/1
40 TABLET ORAL DAILY
Qty: 100 TABLET | Refills: 1 | Status: SHIPPED | OUTPATIENT
Start: 2024-12-31

## 2024-12-31 RX ORDER — LEVOTHYROXINE SODIUM 88 UG/1
88 TABLET ORAL
Qty: 100 TABLET | Refills: 3 | Status: SHIPPED | OUTPATIENT
Start: 2024-12-31

## 2024-12-31 ASSESSMENT — FIBROSIS 4 INDEX: FIB4 SCORE: 0.87

## 2024-12-31 NOTE — PROGRESS NOTES
Verbal consent was acquired by the patient to use the [employer] ambient listening note generation during this visit yes    Subjective:     HPI:   History of Present Illness  The patient presents for evaluation of thyroid management, cholesterol management, blood pressure management, proteinuria, situational depression, and health maintenance.    Thyroid Management  She has been on long-term thyroid medication, which she reports as effective. She is not experiencing any symptoms such as palpitations or constipation.  - Character: No symptoms such as palpitations or constipation.  - Alleviating Factors: Long-term thyroid medication, reported as effective.    Proteinuria  She has a history of proteinuria, identified in 2022, but has not undergone a repeat urinalysis since then.  - Onset: Identified in 2022.  - Duration: Since 2022.  - Character: History of proteinuria.  - Timing: No repeat urinalysis since 2022.    Situational Depression  She has a past medical history of situational depression in 2023, for which she was prescribed medication that she chose not to take. She acknowledges occasional periods of feeling unwell, but these episodes are transient.  - Onset: Diagnosed in 2023.  - Character: Occasional periods of feeling unwell, transient episodes.  - Alleviating/Aggravating Factors: Chose not to take prescribed medication.    Health Maintenance  She has undergone genetic testing through [address] [place] [address], which did not reveal any BRCA1 or BRCA2 mutations. She has no family history of breast cancer among her seven sisters. She is up to date with her pneumonia vaccinations, having received them in 2016 and 2017. She is due for a tetanus booster in 2025. She has expressed interest in receiving the shingles vaccine, but her insurance does not cover it. She has a history of breast cancer, diagnosed over 20 years ago, and underwent a lumpectomy at that time.    Hypertension  She is currently on olmesartan 40 mg, which appears  "to be effectively managing her blood pressure.  Denies chest pain, palpitations, dizziness, blurry vision  - Alleviating Factors: Olmesartan 40 mg, effectively managing blood pressure.    Supplemental Information  She has a history of nosebleeds. She has had turbinate reduction, septoplasty, tendon repair, tibial tendon repair, ankle arthroscopy, hardware removal, tubal ligation, D and C, and lumpectomy. She had breast cancer more than 20 years ago.    SOCIAL HISTORY  She does not smoke. She drinks a cocktail at night before dinner, approximately one drink a day. She does not use drugs.    FAMILY HISTORY  Her mother had liver cancer. Her father had heart disease, hypertension, and an aneurysm. Her paternal grandmother had diabetes. One of her brothers passed away a couple of months ago from a brain-related issue.    ALLERGIES  She has an allergy to TECHNICIUM.    MEDICATIONS  - Synthroid  - Simvastatin  - Olmesartan  - Vitamin D  - Advil    IMMUNIZATIONS  She is up to date on her pneumonia vaccines, having received them in 2016 and 2017. She is due for a tetanus booster in 2025.    Health Maintenance: Completed    Objective:     Exam:  /74 (BP Location: Left arm, Patient Position: Sitting, BP Cuff Size: Adult)   Pulse 82   Temp 37.1 °C (98.7 °F) (Temporal)   Ht 1.575 m (5' 2\")   Wt 78.4 kg (172 lb 12.8 oz)   SpO2 96%   BMI 31.61 kg/m²  Body mass index is 31.61 kg/m².    Physical Exam  Constitutional:       Appearance: Normal appearance.   HENT:      Head: Normocephalic and atraumatic.   Cardiovascular:      Rate and Rhythm: Normal rate.      Pulses: Normal pulses.   Pulmonary:      Effort: Pulmonary effort is normal.   Skin:     General: Skin is warm and dry.      Capillary Refill: Capillary refill takes less than 2 seconds.   Neurological:      General: No focal deficit present.      Mental Status: She is alert and oriented to person, place, and time.           Results  Laboratory Studies  Vitamin D " levels were good. CBC and CMP were excellent.    Imaging  Bone density test in 2022 showed normal results.    Assessment & Plan:     1. Acquired hypothyroidism  levothyroxine (SYNTHROID) 88 MCG Tab    TSH    TRIIDOTHYRONINE    FREE THYROXINE      2. Mixed hyperlipidemia  simvastatin (ZOCOR) 40 MG Tab    Lipid Profile      3. Essential hypertension  CBC WITH DIFFERENTIAL    Comp Metabolic Panel    olmesartan (BENICAR) 40 MG Tab      4. Other proteinuria  URINALYSIS          Assessment & Plan  1. Hypothyrioidism - Her thyroid function will be evaluated in February 2025.  - A prescription for Synthroid has been issued to [facility].  -Continue Synthroid 88 mcg by mouth daily    2. Hyperlipidemia - Cholesterol levels will be checked during the upcoming lab work.  - A prescription for simvastatin 40 mg PO daily has been issued to [facility].    3.Hypertension - Her blood pressure readings have consistently remained below 140, indicating effective management.  - A prescription for olmesartan 40 mg has been issued.    4. Proteinuria - The presence of protein in her urine, as noted in 2022, is likely attributable to an infection.  - A urinalysis will be conducted to confirm the resolution of proteinuria.  - If proteinuria persists, further investigation into renal function will be warranted.    5. Situational depression - She experienced situational depression in 2023 but did not take the prescribed medication.  - No current symptoms reported.    6. Health maintenance - She has completed the BRCA1 and BRCA2 genetic tests through [facility], which yielded negative results.  - She is up to date with her pneumonia vaccinations, having received them in 2016 and 2017.  - She is due for a tetanus booster in 2025.  - Her bone density test in 2022 showed normal results, and she is scheduled for a repeat test in 2027.  - She is advised to receive the shingles vaccine and the RSV vaccine, both of which can be administered at  the pharmacy.  - A comprehensive physical examination and memory screening will be conducted during her annual wellness visit.    Follow-up  - The patient is scheduled for a follow-up visit in 3 months for her annual wellness examination.    PROCEDURE  The patient has undergone several procedures in the past, including turbinate reduction, septoplasty, tendon repair, tibial tendon repair, ankle arthroscopy, hardware removal, tubal ligation, D and C, and lumpectomy.      Return in about 3 months (around 3/31/2025), or if symptoms worsen or fail to improve, for AWV.    I have placed the below orders and discussed them with an approved delegating provider. The MA is performing the below orders under the direction of Dr. Andrews.      Please note that this dictation was created using voice recognition software. I have made every reasonable attempt to correct obvious errors, but I expect that there are errors of grammar and possibly content that I did not discover before finalizing the note.

## 2025-01-05 DIAGNOSIS — I10 ESSENTIAL HYPERTENSION: ICD-10-CM

## 2025-01-06 RX ORDER — OLMESARTAN MEDOXOMIL 40 MG/1
40 TABLET ORAL DAILY
Qty: 100 TABLET | Refills: 1 | Status: SHIPPED | OUTPATIENT
Start: 2025-01-06

## 2025-01-06 NOTE — TELEPHONE ENCOUNTER
Received request via: Patient    Was the patient seen in the last year in this department? Yes    Does the patient have an active prescription (recently filled or refills available) for medication(s) requested? No    Does the patient have nursing home Plus and need 100-day supply? (This applies to ALL medications) Patient does not have SCP

## 2025-01-09 ENCOUNTER — OFFICE VISIT (OUTPATIENT)
Dept: URGENT CARE | Facility: CLINIC | Age: 75
End: 2025-01-09
Payer: MEDICARE

## 2025-01-09 VITALS
TEMPERATURE: 97.8 F | BODY MASS INDEX: 31.28 KG/M2 | RESPIRATION RATE: 16 BRPM | HEART RATE: 89 BPM | HEIGHT: 62 IN | SYSTOLIC BLOOD PRESSURE: 170 MMHG | OXYGEN SATURATION: 95 % | DIASTOLIC BLOOD PRESSURE: 90 MMHG | WEIGHT: 170 LBS

## 2025-01-09 DIAGNOSIS — I10 ESSENTIAL HYPERTENSION: ICD-10-CM

## 2025-01-09 DIAGNOSIS — H11.30 SUBCONJUNCTIVAL HEMORRHAGE, UNSPECIFIED LATERALITY: ICD-10-CM

## 2025-01-09 PROCEDURE — 3077F SYST BP >= 140 MM HG: CPT

## 2025-01-09 PROCEDURE — 99213 OFFICE O/P EST LOW 20 MIN: CPT

## 2025-01-09 PROCEDURE — 3080F DIAST BP >= 90 MM HG: CPT

## 2025-01-09 ASSESSMENT — ENCOUNTER SYMPTOMS
BLURRED VISION: 0
ABDOMINAL PAIN: 0
PHOTOPHOBIA: 0
DIZZINESS: 0
FEVER: 0
HEADACHES: 0
DOUBLE VISION: 0
DIARRHEA: 0
EYE PAIN: 0
VOMITING: 0
EYE REDNESS: 1
SHORTNESS OF BREATH: 0
MYALGIAS: 0
CHILLS: 0
EYE DISCHARGE: 0
COUGH: 0
NAUSEA: 0

## 2025-01-09 ASSESSMENT — VISUAL ACUITY
OS_CC: 20/30
OD_CC: 20/30

## 2025-01-09 ASSESSMENT — FIBROSIS 4 INDEX: FIB4 SCORE: 0.87

## 2025-01-09 NOTE — ASSESSMENT & PLAN NOTE
- Discussed elevated BP with pt today.   - Educated pt to check BP at home and create a log  - Encouraged pt to make dietary modifications, such as limiting salt.  - Advised pt to f/u with PCP for further BP management

## 2025-01-09 NOTE — PROGRESS NOTES
Subjective:     Chief Complaint   Patient presents with    Eye Problem     Left eye is sore and red x 2 days       HPI:  Yuki Caraballo is a 74 y.o. female who presents for concerns of blood in her left eye that occurred spontaneously approximately 2 days ago. She reports chronic sneezing and coughing due to excess mucous for which she is seeing an ENT later this month.  She thinks that the redness in her eye developed after sneezing.  Denies irritation and itching. Denies associated eye pain, changes in vision, double vision, photophobia, or foreign body sensation. Denies headache or nausea.            ROS:  Review of Systems   Constitutional:  Negative for chills and fever.   HENT: Negative.     Eyes:  Positive for redness. Negative for blurred vision, double vision, photophobia, pain and discharge.   Respiratory:  Negative for cough and shortness of breath.    Cardiovascular:  Negative for chest pain.   Gastrointestinal:  Negative for abdominal pain, diarrhea, nausea and vomiting.   Genitourinary:  Negative for dysuria, frequency and urgency.   Musculoskeletal:  Negative for myalgias.   Skin:  Negative for rash.   Neurological:  Negative for dizziness and headaches.   All other systems reviewed and are negative.       CURRENT MEDICATIONS:  Current Outpatient Medications   Medication Sig Refill Last Dispense    acetaminophen (TYLENOL) 325 MG Tab Take 650 mg by mouth every 6 hours as needed. 0 Unknown (patient-reported)    ibuprofen (MOTRIN) 200 MG Tab Take 200 mg by mouth every 6 hours as needed.  Unknown (patient-reported)    levothyroxine (SYNTHROID) 88 MCG Tab Take 1 Tablet by mouth every morning on an empty stomach. 3 Unknown (outside pharmacy)    Multiple Vitamin (MULTIVITAMIN ADULT PO) Take 1 Tablet by mouth every day.  Unknown (patient-reported)    olmesartan (BENICAR) 40 MG Tab Take 1 Tablet by mouth every day. 1 Unknown (outside pharmacy)    Probiotic Product (PROBIOTIC DAILY PO) Take 1 Capsule by  "mouth every day.  Unknown (patient-reported)    simvastatin (ZOCOR) 40 MG Tab Take 1 Tablet by mouth every evening. 1 Unknown (outside pharmacy)       ALLERGIES:   Allergies   Allergen Reactions    Tequin [Gatifloxacin] Anaphylaxis, Hives and Swelling       PROBLEM LIST:    does not have any pertinent problems on file.    Allergies, Medications, & Tobacco/Substance Use were reconciled by the Medical Assistant and reviewed by myself.     Objective:   BP (!) 170/90 (BP Location: Left arm, Patient Position: Sitting, BP Cuff Size: Adult)   Pulse 89   Temp 36.6 °C (97.8 °F) (Temporal)   Resp 16   Ht 1.575 m (5' 2\")   Wt 77.1 kg (170 lb)   SpO2 95%   BMI 31.09 kg/m²     Physical Exam  Constitutional:       General: She is not in acute distress.     Appearance: She is not ill-appearing or toxic-appearing.   HENT:      Right Ear: Tympanic membrane normal.      Left Ear: Tympanic membrane normal.   Eyes:      Conjunctiva/sclera:      Right eye: Right conjunctiva is not injected. No exudate or hemorrhage.     Left eye: Hemorrhage present. No exudate.    Cardiovascular:      Rate and Rhythm: Normal rate and regular rhythm.   Pulmonary:      Effort: Pulmonary effort is normal.      Breath sounds: Normal breath sounds.   Skin:     General: Skin is warm and dry.   Neurological:      Mental Status: She is alert.         Assessment/Plan:   Pt's history and physical exam consistent with subconjunctival hemorrhage of the left eye.  It was noted that the patient's blood pressure is elevated today.  She denies any dizziness or visual changes chest pain shortness of breath or headache.She states that several months ago her primary care provider put her on a new BP regimen and she monitored her BP closely.  It was well-controlled.  She states that she is compliant with blood pressure medications and has a blood pressure cuff at home.  I encouraged the patient to check her blood pressure when she gets home today as well as " regularly for the next few days, keeping. If it remains elevated she will contact her PCP and take an extra dose of her BP medication.  If she develops any symptoms she will report to the ER immediately    Assessment & Plan  Subconjunctival hemorrhage, unspecified laterality  - do not take any blood thinning medication, such as asa    Essential hypertension  - Discussed elevated BP with pt today.   - Educated pt to check BP at home and create a log  - Encouraged pt to make dietary modifications, such as limiting salt.  - Advised pt to f/u with PCP for further BP management    Discussed differential diagnosis, management options, risks/benefits, and alternatives to planned treatment. Pt expressed understanding and the treatment plan was agreed upon. Questions were encouraged and answered. Pt encouraged to return to urgent care as needed if new or worsening symptoms or if there is no improvement in condition. Pt educated in red flags and indications to immediately call 911 or present to the Emergency Department. Advised the patient to follow-up with the primary care physician for recheck, reevaluation, and further management.    I personally reviewed prior external notes and test results pertinent to today's visit. I have independently reviewed and interpreted all diagnostics ordered during this visit.    Please note that this dictation was created using voice recognition software. I have made a reasonable attempt to correct obvious errors, but I expect that there are errors of grammar and possibly content that I did not discover before finalizing the note.    This note was electronically signed by KATHY Amaya

## 2025-01-16 ENCOUNTER — APPOINTMENT (OUTPATIENT)
Dept: MEDICAL GROUP | Facility: PHYSICIAN GROUP | Age: 75
End: 2025-01-16
Payer: MEDICARE

## 2025-01-20 ENCOUNTER — OFFICE VISIT (OUTPATIENT)
Dept: MEDICAL GROUP | Facility: PHYSICIAN GROUP | Age: 75
End: 2025-01-20
Payer: MEDICARE

## 2025-01-20 VITALS
DIASTOLIC BLOOD PRESSURE: 80 MMHG | SYSTOLIC BLOOD PRESSURE: 140 MMHG | BODY MASS INDEX: 31.62 KG/M2 | HEIGHT: 62 IN | OXYGEN SATURATION: 96 % | TEMPERATURE: 98.3 F | WEIGHT: 171.8 LBS | HEART RATE: 72 BPM

## 2025-01-20 DIAGNOSIS — R10.9 ABDOMINAL PAIN, UNSPECIFIED ABDOMINAL LOCATION: ICD-10-CM

## 2025-01-20 DIAGNOSIS — E03.9 ACQUIRED HYPOTHYROIDISM: ICD-10-CM

## 2025-01-20 DIAGNOSIS — Z82.49 FAMILY HISTORY OF ABDOMINAL AORTIC ANEURYSM (AAA): ICD-10-CM

## 2025-01-20 DIAGNOSIS — I10 ESSENTIAL HYPERTENSION: ICD-10-CM

## 2025-01-20 PROCEDURE — 3077F SYST BP >= 140 MM HG: CPT | Performed by: NURSE PRACTITIONER

## 2025-01-20 PROCEDURE — 99214 OFFICE O/P EST MOD 30 MIN: CPT | Performed by: NURSE PRACTITIONER

## 2025-01-20 PROCEDURE — 3079F DIAST BP 80-89 MM HG: CPT | Performed by: NURSE PRACTITIONER

## 2025-01-20 RX ORDER — HYDROCHLOROTHIAZIDE 12.5 MG/1
12.5 CAPSULE ORAL DAILY
Qty: 100 CAPSULE | Refills: 0 | Status: SHIPPED | OUTPATIENT
Start: 2025-01-20

## 2025-01-20 ASSESSMENT — PATIENT HEALTH QUESTIONNAIRE - PHQ9: CLINICAL INTERPRETATION OF PHQ2 SCORE: 0

## 2025-01-20 ASSESSMENT — FIBROSIS 4 INDEX: FIB4 SCORE: 0.87

## 2025-01-21 ENCOUNTER — APPOINTMENT (OUTPATIENT)
Dept: MEDICAL GROUP | Facility: PHYSICIAN GROUP | Age: 75
End: 2025-01-21
Payer: MEDICARE

## 2025-01-21 NOTE — PROGRESS NOTES
Verbal consent was acquired by the patient to use [employer] ambient listening note generation during this visit yes    Subjective:     HPI:   History of Present Illness  The patient presents for evaluation of hypertension. She is accompanied by her sister.    Hypertension/Headaches  Her blood pressure has been consistently elevated, with readings typically around 150s systolic. She reports a recent episode of subconjunctival hemorrhage, which was not associated with any visual disturbances but coincided with a significant increase in her blood pressure to over 170. She attributes this spike to frequent sneezing episodes. She has been experiencing persistent headaches, a symptom that is unusual for her. These headaches occur daily upon awakening and persist throughout the day. She describes the pain as a squeezing sensation located at the back of her head. She reports no changes in her dietary habits, including salt intake, and does not consume energy drinks. Her daily caffeine intake is limited to one cup of coffee. She has not previously been prescribed diuretics. She reports no history of hypokalemia and maintains a high potassium diet, primarily through banana consumption. She also reports frequent urination, necessitating three nightly bathroom visits. She has scheduled laboratory tests for early February 2024. She has been self-monitoring her blood pressure but has reduced the frequency of these checks due to associated anxiety. She is currently on a regimen of Benicar, administered in the morning.  - Onset: Persistent headaches started recently; subconjunctival hemorrhage coincided with a significant increase in blood pressure.  - Location: Headaches located at the back of her head.  - Duration: Headaches occur daily upon awakening and persist throughout the day.  - Character: Squeezing sensation for headaches; subconjunctival hemorrhage without visual disturbances.  - Alleviating/Aggravating Factors: Nomi  "in blood pressure attributed to frequent sneezing episodes; no changes in dietary habits or caffeine intake.  - Timing: Blood pressure readings typically around 150s systolic; significant increase to over 170 during the episode.  - Severity: Persistent headaches; significant increase in blood pressure; frequent urination necessitating three nightly bathroom visits.    Intermittent Flank Pain  She has been experiencing intermittent flank pain for the past few weeks, which she initially attributed to an uncomfortable sitting position on a wooden chair. Despite discontinuing the use of this chair, the pain persists, particularly when she is seated or applies pressure to the area. She reports no associated chest pain or palpitations but notes occasional tachycardia when lying down at night.  - Onset: Past few weeks.  - Location: Flank area.  - Duration: Intermittent.  - Character: Pain persists particularly when seated or applying pressure to the area.  - Alleviating/Aggravating Factors: Initially attributed to an uncomfortable sitting position on a wooden chair; persists despite discontinuing the use of the chair.  - Timing: Occasional tachycardia when lying down at night.  - Severity: Persistent pain despite discontinuing the use of the chair.    FAMILY HISTORY  Her father  of an abdominal aortic aneurysm.    MEDICATIONS  - Benicar    Health Maintenance: Completed    Objective:     Exam:  BP (!) 140/80 (BP Location: Left arm, Patient Position: Sitting, BP Cuff Size: Adult)   Pulse 72   Temp 36.8 °C (98.3 °F) (Temporal)   Ht 1.575 m (5' 2\")   Wt 77.9 kg (171 lb 12.8 oz)   SpO2 96%   BMI 31.42 kg/m²  Body mass index is 31.42 kg/m².    Physical Exam  Constitutional:       Appearance: Normal appearance.          Comments: Muscle pain to palpation   HENT:      Head: Normocephalic and atraumatic.   Cardiovascular:      Rate and Rhythm: Normal rate and regular rhythm.      Pulses: Normal pulses.      Heart sounds: " Normal heart sounds.   Pulmonary:      Effort: Pulmonary effort is normal.      Breath sounds: Normal breath sounds.   Skin:     General: Skin is warm and dry.      Capillary Refill: Capillary refill takes less than 2 seconds.   Neurological:      General: No focal deficit present.      Mental Status: She is alert and oriented to person, place, and time.         A chaperone was offered to the patient during today's exam.: Patient declined.    Results      Assessment & Plan:     1. Essential hypertension  hydrochlorothiazide (MICROZIDE) 12.5 MG capsule    RENIN ACTIVITY AND ALDOSTERONE    US-RENAL ARTERY DUPLEX COMP    US-ABDOMINAL AORTA W/O DOPPLER      2. Acquired hypothyroidism        3. BMI 31.0-31.9,adult  Patient identified as having weight management issue.  Appropriate orders and counseling given.      4. Abdominal pain, unspecified abdominal location  US-ABDOMINAL AORTA W/O DOPPLER      5. Family history of abdominal aortic aneurysm (AAA)  US-ABDOMINAL AORTA W/O DOPPLER          Assessment & Plan  1. Hypertension - Blood pressure remains elevated despite higher dose of Benicar, with readings mostly around 150/85. Experiences headaches and occasional palpitations.  - Add hydrochlorothiazide to current regimen  - Take in the morning and stay hydrated throughout the day  - Order ultrasound of abdominal aorta and renal arteries to rule out secondary causes of hypertension  - Check renin activity and aldosterone levels during scheduled labs in early February 2024  - Report any side effects such as dizziness or excessive urination    2. Flank pain - Reports flank pain over the last few weeks, especially when sitting or pressing on the area. Initially attributed to sitting in a chair with a wooden arm. Could be musculoskeletal, but further evaluation is warranted.  - Order ultrasound of abdominal aorta and renal arteries to rule out underlying issues, including kidney stones or vascular  abnormalities    Follow-up  - Follow up in 1 month        Return in about 1 month (around 2/20/2025), or if symptoms worsen or fail to improve.    I have placed the below orders and discussed them with an approved delegating provider. The MA is performing the below orders under the direction of Dr. Guillaume.      Please note that this dictation was created using voice recognition software. I have made every reasonable attempt to correct obvious errors, but I expect that there are errors of grammar and possibly content that I did not discover before finalizing the note.

## 2025-02-11 ENCOUNTER — HOSPITAL ENCOUNTER (OUTPATIENT)
Dept: LAB | Facility: MEDICAL CENTER | Age: 75
End: 2025-02-11
Attending: NURSE PRACTITIONER
Payer: MEDICARE

## 2025-02-11 DIAGNOSIS — R80.8 OTHER PROTEINURIA: ICD-10-CM

## 2025-02-11 DIAGNOSIS — I10 ESSENTIAL HYPERTENSION: ICD-10-CM

## 2025-02-11 DIAGNOSIS — E78.2 MIXED HYPERLIPIDEMIA: ICD-10-CM

## 2025-02-11 DIAGNOSIS — E03.9 ACQUIRED HYPOTHYROIDISM: ICD-10-CM

## 2025-02-11 LAB
APPEARANCE UR: CLEAR
BACTERIA #/AREA URNS HPF: NORMAL /HPF
BASOPHILS # BLD AUTO: 0.6 % (ref 0–1.8)
BASOPHILS # BLD: 0.04 K/UL (ref 0–0.12)
BILIRUB UR QL STRIP.AUTO: NEGATIVE
CASTS URNS QL MICRO: NORMAL /LPF (ref 0–2)
COLOR UR: YELLOW
EOSINOPHIL # BLD AUTO: 0.1 K/UL (ref 0–0.51)
EOSINOPHIL NFR BLD: 1.4 % (ref 0–6.9)
EPITHELIAL CELLS 1715: NORMAL /HPF (ref 0–5)
ERYTHROCYTE [DISTWIDTH] IN BLOOD BY AUTOMATED COUNT: 41.9 FL (ref 35.9–50)
GLUCOSE UR STRIP.AUTO-MCNC: NEGATIVE MG/DL
HCT VFR BLD AUTO: 40.9 % (ref 37–47)
HGB BLD-MCNC: 14 G/DL (ref 12–16)
IMM GRANULOCYTES # BLD AUTO: 0.03 K/UL (ref 0–0.11)
IMM GRANULOCYTES NFR BLD AUTO: 0.4 % (ref 0–0.9)
KETONES UR STRIP.AUTO-MCNC: NEGATIVE MG/DL
LEUKOCYTE ESTERASE UR QL STRIP.AUTO: ABNORMAL
LYMPHOCYTES # BLD AUTO: 2.07 K/UL (ref 1–4.8)
LYMPHOCYTES NFR BLD: 29.8 % (ref 22–41)
MCH RBC QN AUTO: 31 PG (ref 27–33)
MCHC RBC AUTO-ENTMCNC: 34.2 G/DL (ref 32.2–35.5)
MCV RBC AUTO: 90.5 FL (ref 81.4–97.8)
MICRO URNS: ABNORMAL
MONOCYTES # BLD AUTO: 0.67 K/UL (ref 0–0.85)
MONOCYTES NFR BLD AUTO: 9.6 % (ref 0–13.4)
NEUTROPHILS # BLD AUTO: 4.04 K/UL (ref 1.82–7.42)
NEUTROPHILS NFR BLD: 58.2 % (ref 44–72)
NITRITE UR QL STRIP.AUTO: NEGATIVE
NRBC # BLD AUTO: 0 K/UL
NRBC BLD-RTO: 0 /100 WBC (ref 0–0.2)
PH UR STRIP.AUTO: 6 [PH] (ref 5–8)
PLATELET # BLD AUTO: 343 K/UL (ref 164–446)
PMV BLD AUTO: 9.7 FL (ref 9–12.9)
PROT UR QL STRIP: NEGATIVE MG/DL
RBC # BLD AUTO: 4.52 M/UL (ref 4.2–5.4)
RBC # URNS HPF: NORMAL /HPF (ref 0–2)
RBC UR QL AUTO: NEGATIVE
SP GR UR STRIP.AUTO: 1.01
UROBILINOGEN UR STRIP.AUTO-MCNC: 0.2 EU/DL
WBC # BLD AUTO: 7 K/UL (ref 4.8–10.8)
WBC #/AREA URNS HPF: NORMAL /HPF

## 2025-02-11 PROCEDURE — 84480 ASSAY TRIIODOTHYRONINE (T3): CPT

## 2025-02-11 PROCEDURE — 84443 ASSAY THYROID STIM HORMONE: CPT

## 2025-02-11 PROCEDURE — 80053 COMPREHEN METABOLIC PANEL: CPT

## 2025-02-11 PROCEDURE — 36415 COLL VENOUS BLD VENIPUNCTURE: CPT

## 2025-02-11 PROCEDURE — 84439 ASSAY OF FREE THYROXINE: CPT

## 2025-02-11 PROCEDURE — 81001 URINALYSIS AUTO W/SCOPE: CPT

## 2025-02-11 PROCEDURE — 85025 COMPLETE CBC W/AUTO DIFF WBC: CPT

## 2025-02-11 PROCEDURE — 80061 LIPID PANEL: CPT

## 2025-02-12 ENCOUNTER — RESULTS FOLLOW-UP (OUTPATIENT)
Dept: MEDICAL GROUP | Facility: PHYSICIAN GROUP | Age: 75
End: 2025-02-12

## 2025-02-12 LAB
ALBUMIN SERPL BCP-MCNC: 4 G/DL (ref 3.2–4.9)
ALBUMIN/GLOB SERPL: 1.1 G/DL
ALP SERPL-CCNC: 58 U/L (ref 30–99)
ALT SERPL-CCNC: 29 U/L (ref 2–50)
ANION GAP SERPL CALC-SCNC: 10 MMOL/L (ref 7–16)
AST SERPL-CCNC: 24 U/L (ref 12–45)
BILIRUB SERPL-MCNC: 0.5 MG/DL (ref 0.1–1.5)
BUN SERPL-MCNC: 20 MG/DL (ref 8–22)
CALCIUM ALBUM COR SERPL-MCNC: 9.7 MG/DL (ref 8.5–10.5)
CALCIUM SERPL-MCNC: 9.7 MG/DL (ref 8.5–10.5)
CHLORIDE SERPL-SCNC: 96 MMOL/L (ref 96–112)
CHOLEST SERPL-MCNC: 155 MG/DL (ref 100–199)
CO2 SERPL-SCNC: 26 MMOL/L (ref 20–33)
CREAT SERPL-MCNC: 0.99 MG/DL (ref 0.5–1.4)
FASTING STATUS PATIENT QL REPORTED: NORMAL
GFR SERPLBLD CREATININE-BSD FMLA CKD-EPI: 59 ML/MIN/1.73 M 2
GLOBULIN SER CALC-MCNC: 3.5 G/DL (ref 1.9–3.5)
GLUCOSE SERPL-MCNC: 99 MG/DL (ref 65–99)
HDLC SERPL-MCNC: 46 MG/DL
LDLC SERPL CALC-MCNC: 80 MG/DL
POTASSIUM SERPL-SCNC: 3.6 MMOL/L (ref 3.6–5.5)
PROT SERPL-MCNC: 7.5 G/DL (ref 6–8.2)
SODIUM SERPL-SCNC: 132 MMOL/L (ref 135–145)
T3 SERPL-MCNC: 86.6 NG/DL (ref 60–181)
T4 FREE SERPL-MCNC: 1.88 NG/DL (ref 0.93–1.7)
TRIGL SERPL-MCNC: 143 MG/DL (ref 0–149)
TSH SERPL-ACNC: 0.46 UIU/ML (ref 0.35–5.5)

## 2025-02-18 ENCOUNTER — RESULTS FOLLOW-UP (OUTPATIENT)
Dept: MEDICAL GROUP | Facility: PHYSICIAN GROUP | Age: 75
End: 2025-02-18

## 2025-02-18 ENCOUNTER — HOSPITAL ENCOUNTER (OUTPATIENT)
Dept: RADIOLOGY | Facility: MEDICAL CENTER | Age: 75
End: 2025-02-18
Attending: NURSE PRACTITIONER
Payer: MEDICARE

## 2025-02-18 DIAGNOSIS — I10 ESSENTIAL HYPERTENSION: ICD-10-CM

## 2025-02-18 PROCEDURE — 93975 VASCULAR STUDY: CPT

## 2025-02-20 ENCOUNTER — OFFICE VISIT (OUTPATIENT)
Dept: MEDICAL GROUP | Facility: PHYSICIAN GROUP | Age: 75
End: 2025-02-20
Payer: MEDICARE

## 2025-02-20 VITALS
WEIGHT: 169 LBS | OXYGEN SATURATION: 93 % | TEMPERATURE: 97.5 F | HEIGHT: 62 IN | SYSTOLIC BLOOD PRESSURE: 121 MMHG | DIASTOLIC BLOOD PRESSURE: 81 MMHG | BODY MASS INDEX: 31.1 KG/M2 | HEART RATE: 105 BPM

## 2025-02-20 DIAGNOSIS — N18.31 STAGE 3A CHRONIC KIDNEY DISEASE: ICD-10-CM

## 2025-02-20 DIAGNOSIS — I10 ESSENTIAL HYPERTENSION: ICD-10-CM

## 2025-02-20 DIAGNOSIS — E03.9 ACQUIRED HYPOTHYROIDISM: ICD-10-CM

## 2025-02-20 PROCEDURE — 99214 OFFICE O/P EST MOD 30 MIN: CPT | Performed by: NURSE PRACTITIONER

## 2025-02-20 PROCEDURE — 3074F SYST BP LT 130 MM HG: CPT | Performed by: NURSE PRACTITIONER

## 2025-02-20 PROCEDURE — 3079F DIAST BP 80-89 MM HG: CPT | Performed by: NURSE PRACTITIONER

## 2025-02-20 ASSESSMENT — FIBROSIS 4 INDEX: FIB4 SCORE: 1

## 2025-02-20 NOTE — PROGRESS NOTES
Verbal consent was acquired by the patient to use [employer] ambient listening note generation during this visit yes    Subjective:     HPI:   History of Present Illness  The patient presents for evaluation of low potassium, mucus production, cataracts, and thyroid management.    Hypokalemia  She is uncertain about the potential impact of her diuretic medication on her laboratory results. She maintains a high water intake throughout the day and consumes spinach regularly, which she believes contributes to her potassium levels. Her blood pressure has been well-managed with the diuretic, and she reports no significant side effects. She underwent an ultrasound of her kidneys and aorta 2 days prior to this visit, but the results have not been communicated to her yet.  - Alleviating/Aggravating Factors: High water intake, regular consumption of spinach, diuretic medication.  - Timing: Ultrasound of kidneys and aorta 2 days prior to this visit.  - Severity: No significant side effects reported.    Mucus Production  She sought consultation from an ENT specialist due to excessive mucus production. Despite a CT scan of her sinuses revealing no abnormalities, the cause of her symptoms remains undetermined. She was prescribed Singulair, which she took for a few days before discontinuing due to adverse effects, including illness and lack of energy. She occasionally uses Mucinex to alleviate her symptoms, which provides some relief. Her symptoms fluctuate in severity, but she feels capable of managing them.  - Onset: Consultation with ENT specialist.  - Character: Excessive mucus production.  - Alleviating/Aggravating Factors: Singulair (discontinued due to adverse effects), occasional use of Mucinex.  - Severity: Symptoms fluctuate in severity.    Cataracts  She has been diagnosed with cataracts, described as mild to moderate and located in an unusual position with small bubbles. This has resulted in difficulty driving at  "night, leading her to avoid it. She plans to undergo further testing and potential treatment for her cataracts.  - Character: Mild to moderate cataracts with small bubbles.  - Alleviating/Aggravating Factors: Avoids driving at night.  - Severity: Difficulty driving at night.    Thyroid Management  She has been taking thyroid medication without any issues and is open to adjusting the dosage if necessary.  Most recent T4 was 1.88  - Alleviating/Aggravating Factors: Thyroid medication.  - Severity: No issues reported.    Hypertension  Blood pressure is 121/81 she is doing extremely well with her blood pressure on the hydrochlorothiazide some concern with hypokalemia and lower sodium on repeat labs will increase intake of potassium and sodium in the diet and recheck blood pressure and kidney function at follow-up    Supplemental Information  She has had LASIK surgery on one eye, which has since ceased to function, likely due to the cataract.    FAMILY HISTORY  Her sisters have undergone cataract surgery.    MEDICATIONS  - Current: hydrochlorothiazide  - Current: Mucinex  - Discontinued: Singulair    Health Maintenance: Completed    Objective:     Exam:  /81 (BP Location: Left arm, Patient Position: Sitting, BP Cuff Size: Adult)   Pulse (!) 105   Temp 36.4 °C (97.5 °F) (Temporal)   Ht 1.575 m (5' 2\")   Wt 76.7 kg (169 lb)   SpO2 93%   BMI 30.91 kg/m²  Body mass index is 30.91 kg/m².    Physical Exam  Constitutional:       Appearance: Normal appearance.   HENT:      Head: Normocephalic and atraumatic.   Cardiovascular:      Rate and Rhythm: Normal rate and regular rhythm.      Pulses: Normal pulses.   Pulmonary:      Effort: Pulmonary effort is normal.      Breath sounds: Normal breath sounds.   Skin:     General: Skin is warm and dry.   Neurological:      General: No focal deficit present.      Mental Status: She is alert and oriented to person, place, and time.           Results  Laboratory " Studies  Potassium level was 3.3. GFR decreased slightly. Free T4 is slightly elevated.    Imaging  Ultrasound showed a small right benign renal cyst, no renal artery stenosis, mild renal cortical thinning, and increased velocity in SMA indicating significant stenosis. No aortic aneurysm or plaque noted in the aorta.    Assessment & Plan:     1. Essential hypertension  Comp Metabolic Panel      2. Acquired hypothyroidism  FREE THYROXINE    TSH      3. Stage 3a chronic kidney disease  Comp Metabolic Panel          Assessment & Plan  1. Hypokalemia: Stable. Potassium 3.3. GFR decreased slightly, possibly due to dehydration. No recent surgeries. No recent hospitalization. Differential diagnosis includes dehydration. Associated with diuretic use.  - Increase fluid intake, particularly electrolyte-rich beverages such as Pedialyte or Smartwater.  - Incorporate more potassium-rich foods into the diet, such as spinach, peanuts, and potatoes.  - Add a small amount of salt to meals.  - Re-evaluate GFR in 3 months.  - Consider alternative treatment if kidney function continues to decline.    2. Mucus production: Intermittent.  - Manage with Mucinex as needed.    3. Cataracts: Mild to moderate.  - Consult with an ophthalmologist for further evaluation and potential surgical intervention.    4. Thyroid management: Elevated free T4.  - Skip thyroid medication on Sundays.  - Order repeat TSH and T4 test to be conducted concurrently with CMP for kidney function assessment.    5.  Hypertension  Continue hydrochlorothiazide 12.5 mg by mouth daily    Follow-up  - Follow-up visit scheduled in 3 months.    PROCEDURE  The patient has had LASIK surgery on one eye.          Return in about 3 months (around 5/20/2025), or if symptoms worsen or fail to improve, for HTN.    I have placed the below orders and discussed them with an approved delegating provider. The MA is performing the below orders under the direction of   Markell.      Please note that this dictation was created using voice recognition software. I have made every reasonable attempt to correct obvious errors, but I expect that there are errors of grammar and possibly content that I did not discover before finalizing the note.

## 2025-02-24 DIAGNOSIS — K55.1 SUPERIOR MESENTERIC ARTERY STENOSIS (HCC): ICD-10-CM

## 2025-03-13 ENCOUNTER — OFFICE VISIT (OUTPATIENT)
Facility: MEDICAL CENTER | Age: 75
End: 2025-03-13
Payer: MEDICARE

## 2025-03-13 VITALS
SYSTOLIC BLOOD PRESSURE: 138 MMHG | OXYGEN SATURATION: 94 % | HEIGHT: 62 IN | WEIGHT: 167.22 LBS | TEMPERATURE: 99.5 F | DIASTOLIC BLOOD PRESSURE: 84 MMHG | HEART RATE: 93 BPM | BODY MASS INDEX: 30.77 KG/M2

## 2025-03-13 DIAGNOSIS — K55.1 SUPERIOR MESENTERIC ARTERY STENOSIS (HCC): ICD-10-CM

## 2025-03-13 PROCEDURE — 99203 OFFICE O/P NEW LOW 30 MIN: CPT | Performed by: SURGERY

## 2025-03-13 PROCEDURE — 3079F DIAST BP 80-89 MM HG: CPT | Performed by: SURGERY

## 2025-03-13 PROCEDURE — 3075F SYST BP GE 130 - 139MM HG: CPT | Performed by: SURGERY

## 2025-03-13 ASSESSMENT — FIBROSIS 4 INDEX: FIB4 SCORE: 1

## 2025-03-13 NOTE — H&P
Vascular Surgery            New Patient Consultation    Patient:Yuki Caraballo  MRN:[id number]  Primary care physician:Alberto Diggs, ZACHPSondraRJOHAN.  Referring Provider: Joel Bermudez I do not have to look in a minute of the study rescheduled    Vascular Consultant: Parmjit Sánchez MD     Date: 3/13/2025  _____________________________________________________    Chief Complaint:   Evaluate patient with superior mesenteric artery stenosis documented on visceral artery duplex    History of Present Illness:   Yuki Caraballo  is a 75 y.o. year old female who has a family history of an aortic aneurysm.  She was undergoing a screening aortic ultrasound which demonstrated no aneurysm but did demonstrate what appeared to be a moderate superior mesenteric artery stenosis.  The patient denies any symptoms consistent with chronic mesenteric ischemia.  She has no weight loss or postprandial pain.  See discussion below    Past Medical History:     Past Medical History:   Diagnosis Date    Ankle pain     Arthritis     hands    Breast cancer (HCC)     20+ years ago    Cancer (HCC)     Breast CA Surgery Chemo radiation    Closed fracture of left ankle with routine healing 09/07/2022    Disorder of thyroid     removed noduls    Heart burn     Hemorrhagic disorder (HCC)     nose bleeds, reason for surgery    Hyperlipidemia     Hypertension     working with primary doctor to find right medication, none currently    Other proteinuria 01/25/2022    Situational depression 09/14/2023    Vitamin D deficiency 01/25/2022     Past Surgical History:     Past Surgical History:   Procedure Laterality Date    PB REMOVAL DEEP IMPLANT Left 06/30/2022    Procedure: LEFT ANKLE ARTHROSCOPY, LEFT REMOVAL OF HARDWARE;  Surgeon: Ozzie Cannon M.D.;  Location: [facility];  Service: Orthopedics    PB REPAIR FLEX LEG TENDON,PBIM,EA Left 06/30/2022    Procedure: LEFT POSTERIOR TIBIAL TENDON REPAIR, ,  REPAIRS AS INDICATED;  Surgeon: Ozzie Cannon M.D.;  Location: [facility];  Service: Orthopedics    ORIF, ANKLE Left 08/17/2021    Procedure: ORIF, ANKLE;  Surgeon: Cesario Oneal M.D.;  Location: SURGERY [facility];  Service: Orthopedics    SEPTOPLASTY N/A 06/02/2017    Procedure: SEPTOPLASTY;  Surgeon: Rodrick Coronado M.D.;  Location: SURGERY SAME DAY [facility];  Service:     TURBINATE REDUCTION Bilateral 06/02/2017    Procedure: TURBINATE REDUCTION - RESECTION W/SUBMUCOSAL APPROACH;  Surgeon: Rodrick Coronado M.D.;  Location: SURGERY SAME DAY [facility];  Service:     OTHER  01/01/2012    completed thyroidectomy    GYN SURGERY  04/01/2003    3 or 4 D&Cs    OTHER  01/01/1985    subtotal thyroidectomy    GYN SURGERY  01/01/1975    tubal ligation    LUMPECTOMY       Allergies:     Allergies   Allergen Reactions    Tequin [Gatifloxacin] Anaphylaxis, Hives and Swelling     Medications:     Outpatient Encounter Medications as of 3/13/2025   Medication Sig Dispense Refill    hydrochlorothiazide (MICROZIDE) 12.5 MG capsule Take 1 Capsule by mouth every day. 100 Capsule 0    olmesartan (BENICAR) 40 MG Tab Take 1 Tablet by mouth every day. 100 Tablet 1    levothyroxine (SYNTHROID) 88 MCG Tab Take 1 Tablet by mouth every morning on an empty stomach. 100 Tablet 3    simvastatin (ZOCOR) 40 MG Tab Take 1 Tablet by mouth every evening. 100 Tablet 1    Probiotic Product (PROBIOTIC DAILY PO) Take 1 Capsule by mouth every day.      Multiple Vitamin (MULTIVITAMIN ADULT PO) Take 1 Tablet by mouth every day.      ibuprofen (MOTRIN) 200 MG Tab Take 200 mg by mouth every 6 hours as needed.      acetaminophen (TYLENOL) 325 MG Tab Take 650 mg by mouth every 6 hours as needed. 30 tablet 0     No facility-administered encounter medications on file as of 3/13/2025.     Social History:     Social History     Socioeconomic History    Marital status: Single     Spouse name: Not on file    Number of  children: Not on file    Years of education: Not on file    Highest education level: Some college, no degree   Occupational History    Not on file   Tobacco Use    Smoking status: Never    Smokeless tobacco: Never   Vaping Use    Vaping status: Never Used   Substance and Sexual Activity    Alcohol use: Yes     Alcohol/week: 4.2 oz     Types: 7 Shots of liquor per week     Comment: 5-6 per week    Drug use: No    Sexual activity: Not Currently   Other Topics Concern    Not on file   Social History Narrative    Not on file     Social Drivers of Health     Financial Resource Strain: Low Risk  (12/28/2024)    Overall Financial Resource Strain (CARDIA)     Difficulty of Paying Living Expenses: Not hard at all   Food Insecurity: No Food Insecurity (12/28/2024)    Hunger Vital Sign     Worried About Running Out of Food in the Last Year: Never true     Ran Out of Food in the Last Year: Never true   Transportation Needs: No Transportation Needs (12/28/2024)    PRAPARE - Transportation     Lack of Transportation (Medical): No     Lack of Transportation (Non-Medical): No   Physical Activity: Insufficiently Active (12/28/2024)    Exercise Vital Sign     Days of Exercise per Week: 1 day     Minutes of Exercise per Session: 10 min   Stress: Stress Concern Present (12/28/2024)    [employer] - Occupational Stress Questionnaire     Feeling of Stress : To some extent   Social Connections: Socially Isolated (12/28/2024)    Social Connection and Isolation Panel [NHANES]     Frequency of Communication with Friends and Family: More than three times a week     Frequency of Social Gatherings with Friends and Family: Twice a week     Attends Taoism Services: Never     Active Member of Clubs or Organizations: No     Attends Club or Organization Meetings: Never     Marital Status:    Intimate Partner Violence: Not on file   Housing Stability: Low Risk  (12/28/2024)    Housing Stability Vital Sign      Unable to Pay for Housing in the Last Year: No     Number of Times Moved in the Last Year: 0     Homeless in the Last Year: No      Social History     Tobacco Use   Smoking Status Never   Smokeless Tobacco Never     Social History     Substance and Sexual Activity   Alcohol Use Yes    Alcohol/week: 4.2 oz    Types: 7 Shots of liquor per week    Comment: 5-6 per week     Social History     Substance and Sexual Activity   Drug Use No      Family History:     Family History   Problem Relation Age of Onset    Cancer Mother         liver    Heart Disease Father     Hypertension Father     Hyperlipidemia Father     Arterial Aneurysm Father     Diabetes Paternal Grandmother        Review of Systems:   Constitutional:   Reports -no complaints    Denies Chest pain   Denies SOB   Denies abdominal pain   Denies focal neuro deficits      Exam:   There were no vitals taken for this visit.    Constitutional: Alert, oriented, no acute distress  HEENT:  Normocephalic and atraumatic, EOMI  Neck:   Supple, no JVD,   Cardiovascular: Regular rate and rhythm,   Pulmonary:  Good air entry bilaterally,    Abdominal:  Soft, non-tender, non-distended  Musculoskeletal: No tenderness, no deformity  Neurological:  grossly intact, no focal deficits  Skin:   Skin is warm and dry. No rash noted.  Vascular exam: Upper extremities warm and adequately perfused, no edema     Lower extremities warm and adequately perfused, no edema           Imaging:   Arterial duplex 2/18/2025    Vascular Laboratory   CONCLUSIONS   1.  No sonographic evidence of significant renal artery stenosis.   2.  Incidental right renal benign appearing cysts.   3.  Mild right renal cortical thinning which can be seen with medical renal    disease.   4.  Increased velocities in celiac axis and SMA suggesting significant    stenosis.      Assessment and Plan:   -Superior mesenteric artery stenosis    Patient has mild to moderate superior mesenteric artery stenosis which is  asymptomatic.  I discussed the pathophysiology and natural history of chronic mesenteric ischemia and superior mesenteric artery disease.  The patient does not warrant ongoing surveillance.  I educated her with respect to symptoms consistent with arterial insufficiency and she will give me a call if any of those develop down the road.         _____________________________________________________  Parmjit Sánchez MD  [facility]  [phone]  [address] [place] [address] [address], [facility] [id number]

## 2025-04-08 DIAGNOSIS — I10 ESSENTIAL HYPERTENSION: ICD-10-CM

## 2025-04-08 RX ORDER — OLMESARTAN MEDOXOMIL 40 MG/1
40 TABLET ORAL DAILY
Qty: 100 TABLET | Refills: 3 | Status: SHIPPED | OUTPATIENT
Start: 2025-04-08

## 2025-04-09 NOTE — TELEPHONE ENCOUNTER
Received request via: Pharmacy    Was the patient seen in the last year in this department? Yes    Does the patient have an active prescription (recently filled or refills available) for medication(s) requested? No    Pharmacy Name: [facility]    Does the patient have assisted Plus and need 100-day supply? (This applies to ALL medications) Yes, quantity updated to 100 days

## 2025-04-10 DIAGNOSIS — E78.2 MIXED HYPERLIPIDEMIA: ICD-10-CM

## 2025-04-10 RX ORDER — SIMVASTATIN 40 MG
40 TABLET ORAL NIGHTLY
Qty: 100 TABLET | Refills: 1 | Status: SHIPPED | OUTPATIENT
Start: 2025-04-10

## 2025-05-09 ENCOUNTER — HOSPITAL ENCOUNTER (OUTPATIENT)
Dept: LAB | Facility: MEDICAL CENTER | Age: 75
End: 2025-05-09
Attending: NURSE PRACTITIONER
Payer: MEDICARE

## 2025-05-09 DIAGNOSIS — I10 ESSENTIAL HYPERTENSION: ICD-10-CM

## 2025-05-09 DIAGNOSIS — E03.9 ACQUIRED HYPOTHYROIDISM: ICD-10-CM

## 2025-05-09 DIAGNOSIS — N18.31 STAGE 3A CHRONIC KIDNEY DISEASE: ICD-10-CM

## 2025-05-09 PROCEDURE — 84439 ASSAY OF FREE THYROXINE: CPT

## 2025-05-09 PROCEDURE — 84443 ASSAY THYROID STIM HORMONE: CPT

## 2025-05-09 PROCEDURE — 36415 COLL VENOUS BLD VENIPUNCTURE: CPT

## 2025-05-09 PROCEDURE — 80053 COMPREHEN METABOLIC PANEL: CPT

## 2025-05-10 LAB
ALBUMIN SERPL BCP-MCNC: 4.1 G/DL (ref 3.2–4.9)
ALBUMIN/GLOB SERPL: 1.2 G/DL
ALP SERPL-CCNC: 53 U/L (ref 30–99)
ALT SERPL-CCNC: 34 U/L (ref 2–50)
ANION GAP SERPL CALC-SCNC: 11 MMOL/L (ref 7–16)
AST SERPL-CCNC: 27 U/L (ref 12–45)
BILIRUB SERPL-MCNC: 0.7 MG/DL (ref 0.1–1.5)
BUN SERPL-MCNC: 18 MG/DL (ref 8–22)
CALCIUM ALBUM COR SERPL-MCNC: 9.5 MG/DL (ref 8.5–10.5)
CALCIUM SERPL-MCNC: 9.6 MG/DL (ref 8.5–10.5)
CHLORIDE SERPL-SCNC: 103 MMOL/L (ref 96–112)
CO2 SERPL-SCNC: 25 MMOL/L (ref 20–33)
CREAT SERPL-MCNC: 0.89 MG/DL (ref 0.5–1.4)
GFR SERPLBLD CREATININE-BSD FMLA CKD-EPI: 67 ML/MIN/1.73 M 2
GLOBULIN SER CALC-MCNC: 3.4 G/DL (ref 1.9–3.5)
GLUCOSE SERPL-MCNC: 100 MG/DL (ref 65–99)
POTASSIUM SERPL-SCNC: 4 MMOL/L (ref 3.6–5.5)
PROT SERPL-MCNC: 7.5 G/DL (ref 6–8.2)
SODIUM SERPL-SCNC: 139 MMOL/L (ref 135–145)
T4 FREE SERPL-MCNC: 1.51 NG/DL (ref 0.93–1.7)
TSH SERPL-ACNC: 2.6 UIU/ML (ref 0.38–5.33)

## 2025-05-12 ENCOUNTER — RESULTS FOLLOW-UP (OUTPATIENT)
Dept: MEDICAL GROUP | Facility: PHYSICIAN GROUP | Age: 75
End: 2025-05-12
Payer: MEDICARE

## 2025-05-20 ENCOUNTER — APPOINTMENT (OUTPATIENT)
Dept: MEDICAL GROUP | Facility: PHYSICIAN GROUP | Age: 75
End: 2025-05-20
Payer: MEDICARE

## 2025-05-20 VITALS
SYSTOLIC BLOOD PRESSURE: 160 MMHG | HEART RATE: 92 BPM | BODY MASS INDEX: 32.28 KG/M2 | HEIGHT: 62 IN | TEMPERATURE: 98.5 F | OXYGEN SATURATION: 96 % | WEIGHT: 175.4 LBS | DIASTOLIC BLOOD PRESSURE: 84 MMHG

## 2025-05-20 DIAGNOSIS — Z00.00 MEDICARE ANNUAL WELLNESS VISIT, SUBSEQUENT: Primary | ICD-10-CM

## 2025-05-20 DIAGNOSIS — I10 ESSENTIAL HYPERTENSION: ICD-10-CM

## 2025-05-20 DIAGNOSIS — E03.9 ACQUIRED HYPOTHYROIDISM: ICD-10-CM

## 2025-05-20 PROCEDURE — G0439 PPPS, SUBSEQ VISIT: HCPCS | Performed by: NURSE PRACTITIONER

## 2025-05-20 PROCEDURE — 3077F SYST BP >= 140 MM HG: CPT | Performed by: NURSE PRACTITIONER

## 2025-05-20 PROCEDURE — 3079F DIAST BP 80-89 MM HG: CPT | Performed by: NURSE PRACTITIONER

## 2025-05-20 RX ORDER — AMLODIPINE BESYLATE 5 MG/1
5 TABLET ORAL DAILY
Qty: 100 TABLET | Refills: 3 | Status: SHIPPED | OUTPATIENT
Start: 2025-05-20 | End: 2026-06-24

## 2025-05-20 ASSESSMENT — ENCOUNTER SYMPTOMS: GENERAL WELL-BEING: GOOD

## 2025-05-20 ASSESSMENT — PATIENT HEALTH QUESTIONNAIRE - PHQ9: CLINICAL INTERPRETATION OF PHQ2 SCORE: 0

## 2025-05-20 ASSESSMENT — ACTIVITIES OF DAILY LIVING (ADL): BATHING_REQUIRES_ASSISTANCE: 0

## 2025-05-20 ASSESSMENT — FIBROSIS 4 INDEX: FIB4 SCORE: 1.04

## 2025-05-20 NOTE — PROGRESS NOTES
Chief Complaint   Patient presents with    Medicare Annual Wellness     Verbal consent was acquired by the patient to use [employer] ambient listening note generation during this visit Yes      HPI:  Yuki Caraballo is a 75 y.o. here for Medicare Annual Wellness Visit     History of Present Illness  The patient presents for evaluation of hypertension, chronic kidney disease, thyroid management, and health maintenance.    Hypertension  She reports that her blood pressure has been consistently in the range of 140s/80s, which she perceives as an improvement. However, her blood pressure was notably high today. She has discontinued the use of hydrochlorothiazide due to discomfort and frequent urination. She is open to exploring alternative treatment options, including amlodipine, which she has not previously been prescribed. She acknowledges a lack of physical activity and admits to being resistant to change.  - Onset: Blood pressure notably high today.  - Duration: Consistently in the range of 140s/80s.  - Character: Discomfort and frequent urination with hydrochlorothiazide.  - Alleviating/Aggravating Factors: Discontinued hydrochlorothiazide; open to alternative treatments like amlodipine.  - Severity: Perceived improvement in blood pressure range.    Chronic Kidney Disease stage 2  She maintains a high level of hydration, always carrying water with her and refilling it frequently. She occasionally uses ibuprofen but does not take it daily. Her caffeine intake is limited to one cup per day.  - Alleviating/Aggravating Factors: High level of hydration; occasional use of ibuprofen; limited caffeine intake.    Thyroid Management  She reports feeling well on her current thyroid medication regimen, which she has been on for several years. She has been advised to skip one day of medication each week, which appears to be effective.  - Onset: Several years on current thyroid medication regimen.  - Alleviating/Aggravating  Factors: Skipping one day of medication each week.  - Severity: Feels well on current regimen.    Ankle Stiffness  She expresses a need for a podiatry consultation due to progressive stiffness in her ankle, which was previously diagnosed with arthritis following a fracture four years ago. This condition has resulted in reduced mobility and a sense of instability, necessitating careful movement to prevent falls. She recalls a recent incident where she tripped in the dark but did not sustain any injuries.  - Onset: Progressive stiffness following a fracture four years ago.  - Location: Ankle.  - Duration: Progressive stiffness over four years.  - Character: Reduced mobility and sense of instability.  - Alleviating/Aggravating Factors: Necessitates careful movement to prevent falls.  - Severity: Progressive stiffness; recent incident of tripping without injury.    She has a dermatology screening scheduled for her yearly check-up.       Patient Active Problem List    Diagnosis Date Noted    Post-nasal drainage 11/19/2024    Closed fracture of left ankle with routine healing 09/07/2022    Vitamin D deficiency 01/25/2022    Other proteinuria 01/25/2022    History of breast cancer 12/01/2021    History of fracture of left ankle 12/01/2021    Essential hypertension 08/08/2021    Acquired hypothyroidism 08/08/2021    Mixed hyperlipidemia 08/08/2021       Current Medications[1]       Current supplements as per medication list.     Allergies: Tequin [gatifloxacin]    Current social contact/activities: family events     She  reports that she has never smoked. She has never used smokeless tobacco. She reports current alcohol use of about 4.2 oz of alcohol per week. She reports that she does not use drugs.  Counseling given: Not Answered      ROS:    Gait: Uses no assistive device  Ostomy: No  Other tubes: No  Amputations: No  Chronic oxygen use: No  Last eye exam: 02/2025  Wears hearing aids: No   :  Occasionally    Screening:    Depression Screening  Little interest or pleasure in doing things?  0 - not at all  Feeling down, depressed , or hopeless? 0 - not at all  Patient Health Questionnaire Score: 0     If depressive symptoms identified deferred to follow up visit unless specifically addressed in assessment and plan.    Interpretation of PHQ-9 Total Score   Score Severity   1-4 No Depression   5-9 Mild Depression   10-14 Moderate Depression   15-19 Moderately Severe Depression   20-27 Severe Depression    Screening for Cognitive Impairment  Do you or any of your friends or family members have any concern about your memory? No  Three Minute Recall (Village, Kitchen, Baby) 3/3    Kye clock face with all 12 numbers and set the hands to show 10 minutes past 11.  Yes    Cognitive concerns identified deferred for follow up unless specifically addressed in assessment and plan.    Fall Risk Assessment  Has the patient had two or more falls in the last year or any fall with injury in the last year?  No    Safety Assessment  Do you always wear your seatbelt?  Yes  Any changes to home needed to function safely? No  Difficulty hearing.  Yes  Patient counseled about all safety risks that were identified.    Functional Assessment ADLs  Are there any barriers preventing you from cooking for yourself or meeting nutritional needs?  No.    Are there any barriers preventing you from driving safely or obtaining transportation?  No.    Are there any barriers preventing you from using a telephone or calling for help?  No    Are there any barriers preventing you from shopping?  No.    Are there any barriers preventing you from taking care of your own finances?  No    Are there any barriers preventing you from managing your medications?  No    Are there any barriers preventing you from showering, bathing or dressing yourself? No    Are there any barriers preventing you from doing housework or laundry? No  Are there any barriers  preventing you from using the toilet?No  Are you currently engaging in any exercise or physical activity?  No.      Self-Assessment of Health  What is your perception of your health? Good    Do you sleep more than six hours a night? No    In the past 7 days, how much did pain keep you from doing your normal work? Some    Do you spend quality time with family or friends (virtually or in person)? Yes    Do you usually eat a heart healthy diet that constists of a variety of fruits, vegetables, whole grains and fiber? Yes    Do you eat foods high in fat and/or Fast Food more than three times per week? No    How concerned are you that your medical conditions are not being well managed? Not at all    Are you worried that in the next 2 months, you may not have stable housing that you own, rent, or stay in as part of a household? No      Advance Care Planning  Do you have an Advance Directive, Living Will, Durable Power of , or POLST? No                 Health Maintenance Summary            Current Care Gaps       Annual Wellness Visit (Yearly) Overdue since 9/7/2023 09/07/2022  Subsequent Annual Wellness Visit - Includes PPPS ()              IMM DTaP/Tdap/Td Vaccine (2 - Td or Tdap) Overdue since 3/24/2025      03/24/2015  Imm Admin: Tdap Vaccine                      Upcoming       Zoster (Shingles) Vaccines (1 of 2) Postponed until 10/20/2025     No completion history exists for this topic.              COVID-19 Vaccine (8 - 2024-25 season) Postponed until 5/20/2026 09/23/2024  Imm Admin: COVID-19, mRNA, LNP-S, PF, cassandra-sucrose, 30 mcg/0.3 mL    10/13/2023  Imm Admin: Comirnaty (Covid-19 Vaccine, Mrna, [id number] Formula)    11/02/2022  Imm Admin: PFIZER BIVALENT SARS-COV-2 VACCINE (12+)    04/06/2022  Imm Admin: MODERNA SARS-COV-2 VACCINE (12+)    10/27/2021  Imm Admin: MODERNA SARS-COV-2 VACCINE (12+)     Only the first 5 history entries have been loaded, but more history exists.            Bone  Density Scan (Every 5 Years) Next due on 4/29/2027 04/29/2022  DS-BONE DENSITY STUDY (DEXA)                      Completed or No Longer Recommended       Influenza Vaccine (Series Information) Completed      09/23/2024  Imm Admin: Influenza high-dose trivalent (PF)    10/13/2023  Imm Admin: Influenza Vaccine Adult HD    11/02/2022  Imm Admin: Influenza Vaccine Adult HD    09/16/2021  Imm Admin: Influenza, Unspecified - HISTORICAL DATA    09/15/2020  Imm Admin: Influenza (IM) Preservative Free - HISTORICAL DATA      Only the first 5 history entries have been loaded, but more history exists.              Hepatitis C Screening  Completed      01/03/2023  Hepatitis C Antibody component of HEP C VIRUS ANTIBODY              Pneumococcal Vaccine: 50+ Years (Series Information) Completed      05/11/2017  Imm Admin: Pneumococcal polysaccharide vaccine (PPSV-23)    03/16/2016  Imm Admin: Pneumococcal Conjugate Vaccine (Prevnar/PCV-13)              Hepatitis A Vaccine (Hep A) (Series Information) Aged Out      No completion history exists for this topic.              Hepatitis B Vaccine (Hep B) (Series Information) Aged Out     No completion history exists for this topic.              HPV Vaccines (Series Information) Aged Out     No completion history exists for this topic.              Polio Vaccine (Inactivated Polio) (Series Information) Aged Out     No completion history exists for this topic.              Meningococcal Immunization (Series Information) Aged Out     No completion history exists for this topic.              Meningococcal B Vaccine (Series Information) Aged Out     No completion history exists for this topic.              Mammogram  Discontinued        Frequency changed to Never automatically (Topic No Longer Applies)    08/15/2024  MA-SCREENING MAMMO BILAT W/TOMOSYNTHESIS W/CAD    08/14/2023  MA-SCREENING MAMMO BILAT W/TOMOSYNTHESIS W/CAD    08/01/2022  MA-SCREENING MAMMO BILAT W/TOMOSYNTHESIS W/CAD  "   07/29/2021  MA-SCREENING MAMMO BILAT W/CAD     Only the first 5 history entries have been loaded, but more history exists.                          Patient Care Team:  MARVIN Shaikh as PCP - General (Family Medicine)  DIVINE Archibald as PCP - [facility] Paneled  CUSTOM PHYSICAL THERAPY (Physical Therapy)  Cesario Oneal M.D. (Inactive) (Orthopedic Surgery)        Social History[2]  Family History   Problem Relation Age of Onset    Cancer Mother         liver    Heart Disease Father     Hypertension Father     Hyperlipidemia Father     Arterial Aneurysm Father     Diabetes Paternal Grandmother      She  has a past medical history of Ankle pain, Arthritis, Breast cancer (HCC), Cancer (HCC), Closed fracture of left ankle with routine healing (09/07/2022), Disorder of thyroid, Heart burn, Hemorrhagic disorder (HCC), Hyperlipidemia, Hypertension, Other proteinuria (01/25/2022), Situational depression (09/14/2023), and Vitamin D deficiency (01/25/2022).   Past Surgical History[3]    Exam:   BP (!) 160/84 (BP Location: Left arm, Patient Position: Sitting, BP Cuff Size: Adult)   Pulse 92   Temp 36.9 °C (98.5 °F) (Temporal)   Ht 1.575 m (5' 2\")   Wt 79.6 kg (175 lb 6.4 oz)   SpO2 96%  Body mass index is 32.08 kg/m².    Hearing fair.    Dentition good  Alert, oriented in no acute distress.  Eye contact is good, speech goal directed, affect calm    Assessment and Plan. The following treatment and monitoring plan is recommended:      Assessment & Plan  1. Hypertension: Stable.  - Blood pressure readings have been consistently in the range of 130s to 140s systolic.  - Initiate low dose of amlodipine.  - Engage in regular exercise, including chair exercises with resistance.  - Contact the office immediately if any side effects such as ankle swelling occur.    2. Chronic kidney disease, stage 2: Stable.  - GFR 67.  - Maintain adequate hydration.  - Consume a diet rich in fruits and " vegetables.  - Limit caffeine intake to one cup per day.  - Avoid daily use of ibuprofen.  - Emphasize blood pressure control to reduce kidney irritation.    3. Thyroid management: Stable.  - Thyroid function tests within normal limits.  - Continue current thyroid medication regimen, skipping one day per week.  - Re-evaluate thyroid function in 6 months.  - Monitor to ensure stability and avoid potential medication-associated fluctuations.    4. Health maintenance.  - Receive tetanus vaccine at the pharmacy.  - Schedule an appointment with podiatrist for further evaluation of ankle stiffness.    Follow-up  - Follow-up for blood pressure and thyroid function in 6 months.      Services suggested: No services needed at this time  Health Care Screening: Age-appropriate preventive services recommended by USPTF and ACIP covered by Medicare were discussed today. Services ordered if indicated and agreed upon by the patient.  Referrals offered: Community-based lifestyle interventions to reduce health risks and promote self-management and wellness, fall prevention, nutrition, physical activity, tobacco-use cessation, weight loss, and mental health services as per orders if indicated.    Discussion today about general wellness and lifestyle habits:    Prevent falls and reduce trip hazards; Cautioned about securing or removing rugs.  Have a working fire alarm and carbon monoxide detector;   Engage in regular physical activity and social activities     Follow-up: Return in about 6 months (around 11/20/2025), or if symptoms worsen or fail to improve.          [1]   Current Outpatient Medications   Medication Sig Dispense Refill    simvastatin (ZOCOR) 40 MG Tab Take 1 Tablet by mouth every evening. 100 Tablet 1    olmesartan (BENICAR) 40 MG Tab Take 1 Tablet by mouth every day. 100 Tablet 3    levothyroxine (SYNTHROID) 88 MCG Tab Take 1 Tablet by mouth every morning on an empty stomach. 100 Tablet 3    Probiotic Product (PROBIOTIC  DAILY PO) Take 1 Capsule by mouth every day.      Multiple Vitamin (MULTIVITAMIN ADULT PO) Take 1 Tablet by mouth every day.      ibuprofen (MOTRIN) 200 MG Tab Take 200 mg by mouth every 6 hours as needed.      acetaminophen (TYLENOL) 325 MG Tab Take 650 mg by mouth every 6 hours as needed. 30 tablet 0     No current facility-administered medications for this visit.   [2]   Social History  Tobacco Use    Smoking status: Never    Smokeless tobacco: Never   Vaping Use    Vaping status: Never Used   Substance Use Topics    Alcohol use: Yes     Alcohol/week: 4.2 oz     Types: 7 Shots of liquor per week     Comment: 5-6 per week    Drug use: No   [3]   Past Surgical History:  Procedure Laterality Date    PB REMOVAL DEEP IMPLANT Left 06/30/2022    Procedure: LEFT ANKLE ARTHROSCOPY, LEFT REMOVAL OF HARDWARE;  Surgeon: Ozzie Cannon M.D.;  Location: [facility];  Service: Orthopedics    PB REPAIR FLEX LEG TENDON,PBIM,EA Left 06/30/2022    Procedure: LEFT POSTERIOR TIBIAL TENDON REPAIR, , REPAIRS AS INDICATED;  Surgeon: Ozzie Cannon M.D.;  Location: [facility];  Service: Orthopedics    ORIF, ANKLE Left 08/17/2021    Procedure: ORIF, ANKLE;  Surgeon: Cesario Oneal M.D.;  Location: SURGERY [facility];  Service: Orthopedics    SEPTOPLASTY N/A 06/02/2017    Procedure: SEPTOPLASTY;  Surgeon: Rodrick Coronado M.D.;  Location: SURGERY SAME DAY [facility];  Service:     TURBINATE REDUCTION Bilateral 06/02/2017    Procedure: TURBINATE REDUCTION - RESECTION W/SUBMUCOSAL APPROACH;  Surgeon: Rodrick Coronado M.D.;  Location: SURGERY SAME DAY [facility];  Service:     OTHER  01/01/2012    completed thyroidectomy    GYN SURGERY  04/01/2003    3 or 4 D&Cs    OTHER  01/01/1985    subtotal thyroidectomy    GYN SURGERY  01/01/1975    tubal ligation    LUMPECTOMY

## 2025-06-12 ENCOUNTER — DOCUMENTATION (OUTPATIENT)
Dept: HEALTH INFORMATION MANAGEMENT | Facility: OTHER | Age: 75
End: 2025-06-12
Payer: MEDICARE

## 2025-06-16 ENCOUNTER — OFFICE VISIT (OUTPATIENT)
Dept: DERMATOLOGY | Facility: IMAGING CENTER | Age: 75
End: 2025-06-16
Payer: MEDICARE

## 2025-06-16 DIAGNOSIS — D18.01 CHERRY ANGIOMA: ICD-10-CM

## 2025-06-16 DIAGNOSIS — L73.8 SEBACEOUS HYPERPLASIA: ICD-10-CM

## 2025-06-16 DIAGNOSIS — D22.9 MULTIPLE NEVI: ICD-10-CM

## 2025-06-16 DIAGNOSIS — Z12.83 SKIN CANCER SCREENING: ICD-10-CM

## 2025-06-16 DIAGNOSIS — L82.1 SK (SEBORRHEIC KERATOSIS): ICD-10-CM

## 2025-06-16 DIAGNOSIS — L81.4 LENTIGINES: Primary | ICD-10-CM

## 2025-06-16 PROCEDURE — 99213 OFFICE O/P EST LOW 20 MIN: CPT | Performed by: NURSE PRACTITIONER

## 2025-06-16 NOTE — PROGRESS NOTES
DERMATOLOGY NOTE  Follow up  VISIT       Chief complaint: Follow-Up (ALYSSA )     Annual ALYSSA , no new concerns today       History of skin cancer: No  History of precancers/actinic keratoses: No  History of biopsies:Yes, Details: Benign  History of blistering/severe sunburns:Yes, Details:    Family history of skin cancer:Yes, Details: Unsure if father  Family history of atypical moles:Yes, Details: daughter, son      Allergies   Allergen Reactions    Tequin [Gatifloxacin] Anaphylaxis, Hives and Swelling        MEDICATIONS:  Medications relevant to specialty reviewed.     REVIEW OF SYSTEMS:   Positive for skin (see HPI)  Negative for fevers and chills       EXAM:  There were no vitals taken for this visit.  Constitutional: Well-developed, well-nourished, and in no distress.     A total body skin exam was performed excluding the genitals per patient preference and including the following areas: head (including face), neck, chest, abdomen, groin/buttocks, back, bilateral upper extremities, and bilateral lower extremities with the following pertinent findings listed below and/or in assessment/plan.     -sun exposed skin of trunk and b/l upper, lower extremities and face with scattered clinically benign light brown reticulated macules all of which were morphologically similar and none of which were suspicious for skin cancer today on exam    -Several scattered 1-3mm bright red macules and thin papules on the trunk, scalp, face and extremities    -Multiple tan medium brown skin-colored macules papules scattered over the trunk >> extremities--All with benign-appearing pigment network patterns on dermoscopy    -Several tan medium brown stuck-on waxy papules scattered on the face, trunk, and extremities     -Few scattered yellowish/red papules with telangiectasias and central dell over face      IMPRESSION / PLAN:    1. Lentigines  - Benign-appearing nature of lesions discussed during exam.   - Advised to continue to monitor  for any return to clinic for new or concerning changes.      2. Cherry angioma  - Benign-appearing nature of lesions discussed during exam.   - Advised to continue to monitor for any return to clinic for new or concerning changes.      3. Multiple nevi  - Benign-appearing nature of lesions discussed during exam.   - Advised to continue to monitor for any return to clinic for new or concerning changes.        4. SK (seborrheic keratosis)  - Benign-appearing nature of lesions discussed during exam.   - Advised to continue to monitor for any return to clinic for new or concerning changes.      5. Sebaceous hyperplasia  - Benign-appearing nature of lesions discussed during exam.   - Advised to continue to monitor for any return to clinic for new or concerning changes.      6. Skin cancer screening  Skin cancer education  discussed importance of sun protective clothing, eyewear in addition to the use of broad spectrum sunscreen with SPF 30 or greater, as well as need for reapplication ~every 2 hours when exposed to UVR/handout previously given  discussed importance following up for any new or changing lesions as noted in handout given, but every 12 months exams in clinic in the setting of dermatologic history  ABCDE's of melanoma discussed/handout previously given        I have performed a physical exam and reviewed and updated ROS and Plan today (6/16/2025). In review of dermatology visit (4/18/2024), there are no changes except as documented above.       Please note that this dictation was created using voice recognition software. I have made every reasonable attempt to correct obvious errors, but I expect that there are errors of grammar and possibly content that I did not discover before finalizing the note.      Return to clinic in: Return in about 1 year (around 6/16/2026) for ALYSSA. and as needed for any new or changing skin lesions.

## 2025-07-13 DIAGNOSIS — I10 ESSENTIAL HYPERTENSION: ICD-10-CM

## 2025-07-13 DIAGNOSIS — E78.2 MIXED HYPERLIPIDEMIA: ICD-10-CM

## 2025-07-14 RX ORDER — OLMESARTAN MEDOXOMIL 40 MG/1
40 TABLET ORAL DAILY
Qty: 100 TABLET | Refills: 3 | Status: SHIPPED | OUTPATIENT
Start: 2025-07-14

## 2025-07-14 RX ORDER — SIMVASTATIN 40 MG
40 TABLET ORAL NIGHTLY
Qty: 100 TABLET | Refills: 1 | Status: SHIPPED | OUTPATIENT
Start: 2025-07-14

## 2025-07-14 NOTE — TELEPHONE ENCOUNTER
Received request via: Patient    Was the patient seen in the last year in this department? Yes    Does the patient have an active prescription (recently filled or refills available) for medication(s) requested? No    Pharmacy Name: [id number] - [place], [place] - [address]      Does the patient have half-way Plus and need 100-day supply? (This applies to ALL medications) Yes, quantity updated to 100 days

## 2025-08-22 DIAGNOSIS — I10 ESSENTIAL HYPERTENSION: ICD-10-CM

## 2025-08-25 RX ORDER — AMLODIPINE BESYLATE 5 MG/1
5 TABLET ORAL DAILY
Qty: 100 TABLET | Refills: 0 | Status: SHIPPED | OUTPATIENT
Start: 2025-08-25 | End: 2026-09-29

## 2025-09-12 ENCOUNTER — APPOINTMENT (OUTPATIENT)
Dept: RADIOLOGY | Facility: MEDICAL CENTER | Age: 75
End: 2025-09-12
Attending: NURSE PRACTITIONER
Payer: MEDICARE

## 2025-09-12 DIAGNOSIS — Z12.31 VISIT FOR SCREENING MAMMOGRAM: ICD-10-CM

## (undated) DEVICE — Device

## (undated) DEVICE — DRILL BIT 3.5X110 QC OIC - (10TX2=20)

## (undated) DEVICE — PATTIES SURG X-RAYCOTTONOID - 1/2 X 3 IN (200/CA)

## (undated) DEVICE — SUCTION INSTRUMENT YANKAUER BULBOUS TIP W/O VENT (50EA/CA)

## (undated) DEVICE — CATHETER IV 14 GA X 2 ---SURG.& SDS ONLY---(200EA/CA)

## (undated) DEVICE — SPONGE GAUZESTER. 2X2 4-PL - (2/PK 50PK/BX 30BX/CS)

## (undated) DEVICE — PACK ENT OR - (2EA/CA)

## (undated) DEVICE — TOWEL STOP TIMEOUT SAFETY FLAG (40EA/CA)

## (undated) DEVICE — GLOVE BIOGEL INDICATOR SZ 7.5 SURGICAL PF LTX - (50PR/BX 4BX/CA)

## (undated) DEVICE — CATHETER IV 20 GA X 1-1/4 ---SURG.& SDS ONLY--- (50EA/BX)

## (undated) DEVICE — SLEEVE, VASO, THIGH, MED

## (undated) DEVICE — SODIUM CHL IRRIGATION 0.9% 1000ML (12EA/CA)

## (undated) DEVICE — PADDING CAST 6 IN STERILE - 6 X 4 YDS (24/CA)

## (undated) DEVICE — BIT DRILL CANNULATED 2.7MM X 155MM (3TX1+1TX1=4)

## (undated) DEVICE — PROTECTOR ULNA NERVE - (36PR/CA)

## (undated) DEVICE — HEAD HOLDER JUNIOR/ADULT

## (undated) DEVICE — PACK LOWER EXTREMITY - (2/CA)

## (undated) DEVICE — SUTURE GENERAL

## (undated) DEVICE — ELECTRODE DUAL RETURN W/ CORD - (50/PK)

## (undated) DEVICE — SPLINT NASAL DOYLE AIRWAY (2EA/ST)

## (undated) DEVICE — TUBING CLEARLINK DUO-VENT - C-FLO (48EA/CA)

## (undated) DEVICE — BOVIE FOOT CONTROL SUCTION - 6IN 10FR (25EA/CA)

## (undated) DEVICE — DRILL BIT 2.8MM X 155MM CALIBRATED (8TX2=16)

## (undated) DEVICE — CANISTER SUCTION 3000ML MECHANICAL FILTER AUTO SHUTOFF MEDI-VAC NONSTERILE LF DISP  (40EA/CA)

## (undated) DEVICE — KIT  I.V. START (100EA/CA)

## (undated) DEVICE — ELECTRODE 850 FOAM ADHESIVE - HYDROGEL RADIOTRNSPRNT (50/PK)

## (undated) DEVICE — ANTI-FOG SOLUTION - 60BTL/CA

## (undated) DEVICE — NEPTUNE 4 PORT MANIFOLD - (20/PK)

## (undated) DEVICE — SET EXTENSION WITH 2 PORTS (48EA/CA) ***PART #2C8610 IS A SUBSTITUTE*****

## (undated) DEVICE — MASK ANESTHESIA ADULT  - (100/CA)

## (undated) DEVICE — GLOVE BIOGEL SZ 7.5 SURGICAL PF LTX - (50PR/BX 4BX/CA)

## (undated) DEVICE — GLOVE SZ 7 BIOGEL PI MICRO - PF LF (50PR/BX 4BX/CA)

## (undated) DEVICE — SUTURE 3-0 ETHILON FSLX 30 (36PK/BX)"

## (undated) DEVICE — GOWN WARMING STANDARD FLEX - (30/CA)

## (undated) DEVICE — TUBE CONNECTING SUCTION - CLEAR PLASTIC STERILE 72 IN (50EA/CA)

## (undated) DEVICE — SENSOR SPO2 NEO LNCS ADHESIVE (20/BX) SEE USER NOTES

## (undated) DEVICE — LACTATED RINGERS INJ 1000 ML - (14EA/CA 60CA/PF)

## (undated) DEVICE — KIT ANESTHESIA W/CIRCUIT & 3/LT BAG W/FILTER (20EA/CA)

## (undated) DEVICE — SYRINGE DISP. 12 CC LL - (100/BX)

## (undated) DEVICE — LEAD SET 6 DISP. EKG NIHON KOHDEN (100EA/CA)

## (undated) DEVICE — CHLORAPREP 26 ML APPLICATOR - ORANGE TINT(25/CA)

## (undated) DEVICE — DRAPE C-ARM LARGE 41IN X 74 IN - (10/BX 2BX/CA)

## (undated) DEVICE — DRAPE LARGE 3 QUARTER - (20/CA)

## (undated) DEVICE — SUTURE 2-0 VICRYL PLUS CT-1 - 8 X 18 INCH(12/BX)

## (undated) DEVICE — GUIDE PIN 1.25X150 THRD OIC - (6EA/BX) (5TX6=30)

## (undated) DEVICE — WATER IRRIGATION STERILE 1000ML (12EA/CA)

## (undated) DEVICE — DRILL BIT 1.8MMX80MM CALIBRATED (4TX2=8)

## (undated) DEVICE — CANISTER SUCTION RIGID RED 1500CC (40EA/CA)

## (undated) DEVICE — SYRINGE 30 ML LL (56/BX)

## (undated) DEVICE — DRAPE C ARMOR (12EA/CA)

## (undated) DEVICE — SET LEADWIRE 5 LEAD BEDSIDE DISPOSABLE ECG (1SET OF 5/EA)

## (undated) DEVICE — KIT SURGIFLO W/OUT THROMBIN - (6EA/CA)

## (undated) DEVICE — SUTURE 3-0 ETHILON FS-1 - (36/BX) 30 INCH

## (undated) DEVICE — SUTURE 4-0 VICRYL PLUS P-3 18 (12PK/BX)"